# Patient Record
Sex: FEMALE | Race: BLACK OR AFRICAN AMERICAN | NOT HISPANIC OR LATINO | Employment: FULL TIME | URBAN - NONMETROPOLITAN AREA
[De-identification: names, ages, dates, MRNs, and addresses within clinical notes are randomized per-mention and may not be internally consistent; named-entity substitution may affect disease eponyms.]

---

## 2021-07-31 ENCOUNTER — OFFICE VISIT (OUTPATIENT)
Dept: FAMILY MEDICINE | Facility: CLINIC | Age: 26
End: 2021-07-31
Payer: COMMERCIAL

## 2021-07-31 VITALS
OXYGEN SATURATION: 99 % | SYSTOLIC BLOOD PRESSURE: 130 MMHG | WEIGHT: 250 LBS | TEMPERATURE: 100 F | RESPIRATION RATE: 20 BRPM | HEIGHT: 72 IN | BODY MASS INDEX: 33.86 KG/M2 | DIASTOLIC BLOOD PRESSURE: 89 MMHG | HEART RATE: 79 BPM

## 2021-07-31 DIAGNOSIS — R00.2 PALPITATIONS: ICD-10-CM

## 2021-07-31 DIAGNOSIS — R53.83 FATIGUE, UNSPECIFIED TYPE: Primary | ICD-10-CM

## 2021-07-31 LAB
T3FREE SERPL-MCNC: 3.16 PG/ML (ref 2.18–3.98)
T4 FREE SERPL-MCNC: 0.96 NG/DL (ref 0.76–1.46)
TSH SERPL DL<=0.005 MIU/L-ACNC: 2.04 UIU/ML (ref 0.36–3.74)

## 2021-07-31 PROCEDURE — 3075F SYST BP GE 130 - 139MM HG: CPT | Mod: ,,, | Performed by: NURSE PRACTITIONER

## 2021-07-31 PROCEDURE — 1160F PR REVIEW ALL MEDS BY PRESCRIBER/CLIN PHARMACIST DOCUMENTED: ICD-10-PCS | Mod: ,,, | Performed by: NURSE PRACTITIONER

## 2021-07-31 PROCEDURE — 84439 ASSAY OF FREE THYROXINE: CPT | Mod: ,,, | Performed by: CLINICAL MEDICAL LABORATORY

## 2021-07-31 PROCEDURE — 99202 OFFICE O/P NEW SF 15 MIN: CPT | Mod: ,,, | Performed by: NURSE PRACTITIONER

## 2021-07-31 PROCEDURE — 3079F DIAST BP 80-89 MM HG: CPT | Mod: ,,, | Performed by: NURSE PRACTITIONER

## 2021-07-31 PROCEDURE — 3079F PR MOST RECENT DIASTOLIC BLOOD PRESSURE 80-89 MM HG: ICD-10-PCS | Mod: ,,, | Performed by: NURSE PRACTITIONER

## 2021-07-31 PROCEDURE — 3008F PR BODY MASS INDEX (BMI) DOCUMENTED: ICD-10-PCS | Mod: ,,, | Performed by: NURSE PRACTITIONER

## 2021-07-31 PROCEDURE — 3075F PR MOST RECENT SYSTOLIC BLOOD PRESS GE 130-139MM HG: ICD-10-PCS | Mod: ,,, | Performed by: NURSE PRACTITIONER

## 2021-07-31 PROCEDURE — 84443 ASSAY THYROID STIM HORMONE: CPT | Mod: ,,, | Performed by: CLINICAL MEDICAL LABORATORY

## 2021-07-31 PROCEDURE — 84481 T3, FREE: ICD-10-PCS | Mod: ,,, | Performed by: CLINICAL MEDICAL LABORATORY

## 2021-07-31 PROCEDURE — 99051 PR MEDICAL SERVICES, EVE/WKEND/HOLIDAY: ICD-10-PCS | Mod: ,,, | Performed by: NURSE PRACTITIONER

## 2021-07-31 PROCEDURE — 3008F BODY MASS INDEX DOCD: CPT | Mod: ,,, | Performed by: NURSE PRACTITIONER

## 2021-07-31 PROCEDURE — 1159F MED LIST DOCD IN RCRD: CPT | Mod: ,,, | Performed by: NURSE PRACTITIONER

## 2021-07-31 PROCEDURE — 99202 PR OFFICE/OUTPT VISIT, NEW, LEVL II, 15-29 MIN: ICD-10-PCS | Mod: ,,, | Performed by: NURSE PRACTITIONER

## 2021-07-31 PROCEDURE — 84443 TSH: ICD-10-PCS | Mod: ,,, | Performed by: CLINICAL MEDICAL LABORATORY

## 2021-07-31 PROCEDURE — 1160F RVW MEDS BY RX/DR IN RCRD: CPT | Mod: ,,, | Performed by: NURSE PRACTITIONER

## 2021-07-31 PROCEDURE — 99051 MED SERV EVE/WKEND/HOLIDAY: CPT | Mod: ,,, | Performed by: NURSE PRACTITIONER

## 2021-07-31 PROCEDURE — 1159F PR MEDICATION LIST DOCUMENTED IN MEDICAL RECORD: ICD-10-PCS | Mod: ,,, | Performed by: NURSE PRACTITIONER

## 2021-07-31 PROCEDURE — 84481 FREE ASSAY (FT-3): CPT | Mod: ,,, | Performed by: CLINICAL MEDICAL LABORATORY

## 2021-07-31 PROCEDURE — 84439 T4, FREE: ICD-10-PCS | Mod: ,,, | Performed by: CLINICAL MEDICAL LABORATORY

## 2021-08-02 ENCOUNTER — TELEPHONE (OUTPATIENT)
Dept: FAMILY MEDICINE | Facility: CLINIC | Age: 26
End: 2021-08-02

## 2022-10-17 ENCOUNTER — OFFICE VISIT (OUTPATIENT)
Dept: OBSTETRICS AND GYNECOLOGY | Facility: CLINIC | Age: 27
End: 2022-10-17
Payer: COMMERCIAL

## 2022-10-17 VITALS
RESPIRATION RATE: 17 BRPM | WEIGHT: 272.19 LBS | SYSTOLIC BLOOD PRESSURE: 141 MMHG | DIASTOLIC BLOOD PRESSURE: 94 MMHG | OXYGEN SATURATION: 99 % | HEART RATE: 103 BPM | BODY MASS INDEX: 36.87 KG/M2 | HEIGHT: 72 IN

## 2022-10-17 DIAGNOSIS — Z72.51 HIGH RISK SEXUAL BEHAVIOR, UNSPECIFIED TYPE: ICD-10-CM

## 2022-10-17 DIAGNOSIS — N92.6 IRREGULAR MENSES: ICD-10-CM

## 2022-10-17 DIAGNOSIS — N89.8 VAGINAL DISCHARGE: Primary | ICD-10-CM

## 2022-10-17 LAB
CANDIDA SPECIES: NEGATIVE
GARDNERELLA: POSITIVE
TRICHOMONAS: NEGATIVE

## 2022-10-17 PROCEDURE — 3080F PR MOST RECENT DIASTOLIC BLOOD PRESSURE >= 90 MM HG: ICD-10-PCS | Mod: ,,, | Performed by: ADVANCED PRACTICE MIDWIFE

## 2022-10-17 PROCEDURE — 87480 BACTERIAL VAGINOSIS: ICD-10-PCS | Mod: ,,, | Performed by: CLINICAL MEDICAL LABORATORY

## 2022-10-17 PROCEDURE — 3080F DIAST BP >= 90 MM HG: CPT | Mod: ,,, | Performed by: ADVANCED PRACTICE MIDWIFE

## 2022-10-17 PROCEDURE — 87660 TRICHOMONAS VAGIN DIR PROBE: CPT | Mod: ,,, | Performed by: CLINICAL MEDICAL LABORATORY

## 2022-10-17 PROCEDURE — 87591 CHLAMYDIA/GONORRHOEAE(GC), PCR: ICD-10-PCS | Mod: ,,, | Performed by: CLINICAL MEDICAL LABORATORY

## 2022-10-17 PROCEDURE — 1159F MED LIST DOCD IN RCRD: CPT | Mod: ,,, | Performed by: ADVANCED PRACTICE MIDWIFE

## 2022-10-17 PROCEDURE — 87491 CHLAMYDIA/GONORRHOEAE(GC), PCR: ICD-10-PCS | Mod: ,,, | Performed by: CLINICAL MEDICAL LABORATORY

## 2022-10-17 PROCEDURE — 87660 BACTERIAL VAGINOSIS: ICD-10-PCS | Mod: ,,, | Performed by: CLINICAL MEDICAL LABORATORY

## 2022-10-17 PROCEDURE — 99212 OFFICE O/P EST SF 10 MIN: CPT | Mod: ,,, | Performed by: ADVANCED PRACTICE MIDWIFE

## 2022-10-17 PROCEDURE — 87480 CANDIDA DNA DIR PROBE: CPT | Mod: ,,, | Performed by: CLINICAL MEDICAL LABORATORY

## 2022-10-17 PROCEDURE — 3077F PR MOST RECENT SYSTOLIC BLOOD PRESSURE >= 140 MM HG: ICD-10-PCS | Mod: ,,, | Performed by: ADVANCED PRACTICE MIDWIFE

## 2022-10-17 PROCEDURE — 99212 PR OFFICE/OUTPT VISIT, EST, LEVL II, 10-19 MIN: ICD-10-PCS | Mod: ,,, | Performed by: ADVANCED PRACTICE MIDWIFE

## 2022-10-17 PROCEDURE — 1159F PR MEDICATION LIST DOCUMENTED IN MEDICAL RECORD: ICD-10-PCS | Mod: ,,, | Performed by: ADVANCED PRACTICE MIDWIFE

## 2022-10-17 PROCEDURE — 87510 BACTERIAL VAGINOSIS: ICD-10-PCS | Mod: ,,, | Performed by: CLINICAL MEDICAL LABORATORY

## 2022-10-17 PROCEDURE — 87591 N.GONORRHOEAE DNA AMP PROB: CPT | Mod: ,,, | Performed by: CLINICAL MEDICAL LABORATORY

## 2022-10-17 PROCEDURE — 3077F SYST BP >= 140 MM HG: CPT | Mod: ,,, | Performed by: ADVANCED PRACTICE MIDWIFE

## 2022-10-17 PROCEDURE — 87491 CHLMYD TRACH DNA AMP PROBE: CPT | Mod: ,,, | Performed by: CLINICAL MEDICAL LABORATORY

## 2022-10-17 PROCEDURE — 87510 GARDNER VAG DNA DIR PROBE: CPT | Mod: ,,, | Performed by: CLINICAL MEDICAL LABORATORY

## 2022-10-17 NOTE — PROGRESS NOTES
Patient ID:  Shruthi Christian is a 27 y.o. female      Chief Complaint:   Chief Complaint   Patient presents with    Vaginal Discharge     Watery with odor    Irregular cycle        HPI:  Shruthi is in with c/o watery odorous vag discharge and spotting between her regular menstrual cycle. Reports monthly cycles lasting 5-6 days with heavy bleeding first 3 days. LMP 22, has had spotting off and on since cycle. Denies abd pain. Last sexual encounter 2 months past. Reports Abnormal Pap ASCUS x 2 with colpo in 2022 while living in [place]. Unsure of HPV results. Was told to have repeat Pap in 1 year. Will have medical release signed to obtain Pap report for follow up. Discussed and agrees to STD testing to r/o infection as cause of spotting.   LMP: Patient's last menstrual period was 2022.   Sexually active:  yes  Contraceptive: none      Past Medical History:   Diagnosis Date    Anxiety     BECKY (generalized anxiety disorder)     GERD (gastroesophageal reflux disease)     Menorrhagia      Past Surgical History:   Procedure Laterality Date    WISDOM TOOTH EXTRACTION         OB History          0    Para   0    Term   0       0    AB   0    Living   0         SAB   0    IAB   0    Ectopic   0    Multiple   0    Live Births   0                 BP (!) 141/94 (BP Location: Right arm)   Pulse 103   Resp 17   Ht 6' (1.829 m)   Wt 123.5 kg (272 lb 3.2 oz)   LMP 2022   SpO2 99%   BMI 36.92 kg/m²   Wt Readings from Last 3 Encounters:   10/17/22 123.5 kg (272 lb 3.2 oz)   21 113.4 kg (250 lb)          ROS:  Review of Systems   Constitutional: Negative.    Eyes: Negative.    Respiratory: Negative.     Cardiovascular: Negative.    Gastrointestinal: Negative.    Genitourinary:  Positive for menstrual problem, vaginal bleeding and vaginal odor.   Musculoskeletal: Negative.    Neurological: Negative.    Psychiatric/Behavioral: Negative.          PHYSICAL EXAM:  Physical  Exam  Constitutional:       Appearance: Normal appearance. She is obese.   Eyes:      Extraocular Movements: Extraocular movements intact.   Cardiovascular:      Rate and Rhythm: Normal rate.      Pulses: Normal pulses.   Pulmonary:      Effort: Pulmonary effort is normal.   Abdominal:      Palpations: Abdomen is soft.   Musculoskeletal:         General: Normal range of motion.      Cervical back: Normal range of motion.   Skin:     General: Skin is warm and dry.   Neurological:      Mental Status: She is alert and oriented to person, place, and time.   Psychiatric:         Mood and Affect: Mood normal.         Behavior: Behavior normal.         Thought Content: Thought content normal.         Judgment: Judgment normal.        Assessment:  Shruthi was seen today for vaginal discharge and irregular cycle.    Diagnoses and all orders for this visit:    Vaginal discharge    High risk sexual behavior, unspecified type  -     Chlamydia/GC, PCR; Future  -     Bacterial Vaginosis; Future  -     Chlamydia/GC, PCR  -     Bacterial Vaginosis    Irregular menses    BMI 36.0-36.9,adult        ICD-10-CM ICD-9-CM    1. Vaginal discharge  N89.8 623.5       2. High risk sexual behavior, unspecified type  Z72.51 V69.2 Chlamydia/GC, PCR      Bacterial Vaginosis      Chlamydia/GC, PCR      Bacterial Vaginosis      3. Irregular menses  N92.6 626.4       4. BMI 36.0-36.9,adult  Z68.36 V85.36           Plan:  Affirm swab self collected  Urine sent for GC and chlamydia  Will call with results, encouraged use of My Chart for lab review  Encouraged use of condoms during sexual encounter to prevent STD exposure    Follow up if symptoms worsen or fail to improve.

## 2022-10-18 ENCOUNTER — TELEPHONE (OUTPATIENT)
Dept: OBSTETRICS AND GYNECOLOGY | Facility: CLINIC | Age: 27
End: 2022-10-18
Payer: COMMERCIAL

## 2022-10-18 DIAGNOSIS — B37.9 YEAST INFECTION: ICD-10-CM

## 2022-10-18 DIAGNOSIS — B96.89 BACTERIAL VAGINAL INFECTION: Primary | ICD-10-CM

## 2022-10-18 DIAGNOSIS — N76.0 BACTERIAL VAGINAL INFECTION: Primary | ICD-10-CM

## 2022-10-18 LAB
CHLAMYDIA BY PCR: NEGATIVE
N. GONORRHOEAE (GC) BY PCR: NEGATIVE

## 2022-10-18 RX ORDER — METRONIDAZOLE 500 MG/1
500 TABLET ORAL 2 TIMES DAILY
Qty: 14 TABLET | Refills: 0 | Status: SHIPPED | OUTPATIENT
Start: 2022-10-18 | End: 2022-10-25

## 2022-10-18 RX ORDER — FLUCONAZOLE 150 MG/1
150 TABLET ORAL
Qty: 3 TABLET | Refills: 0 | Status: SHIPPED | OUTPATIENT
Start: 2022-10-18 | End: 2022-10-25

## 2022-10-18 NOTE — TELEPHONE ENCOUNTER
----- Message from Jacquie Adams sent at 10/18/2022 10:45 AM CDT -----  Patient wants to know when meds will be called in at [facility] on [address].      Patient callback [phone]

## 2022-10-18 NOTE — TELEPHONE ENCOUNTER
----- Message from Shahla Mcclain sent at 10/18/2022  8:29 AM CDT -----  Pt called requesting a call back.      Call back # [phone]

## 2022-12-01 ENCOUNTER — OFFICE VISIT (OUTPATIENT)
Dept: OBSTETRICS AND GYNECOLOGY | Facility: CLINIC | Age: 27
End: 2022-12-01
Payer: COMMERCIAL

## 2022-12-01 VITALS
DIASTOLIC BLOOD PRESSURE: 76 MMHG | SYSTOLIC BLOOD PRESSURE: 124 MMHG | HEART RATE: 94 BPM | HEIGHT: 72 IN | OXYGEN SATURATION: 99 % | BODY MASS INDEX: 36.33 KG/M2 | WEIGHT: 268.19 LBS | RESPIRATION RATE: 17 BRPM

## 2022-12-01 DIAGNOSIS — Z32.01 POSITIVE URINE PREGNANCY TEST: ICD-10-CM

## 2022-12-01 DIAGNOSIS — N91.2 AMENORRHEA: Primary | ICD-10-CM

## 2022-12-01 PROBLEM — N92.6 IRREGULAR MENSES: Status: RESOLVED | Noted: 2022-10-17 | Resolved: 2022-12-01

## 2022-12-01 PROBLEM — N89.8 VAGINAL DISCHARGE: Status: RESOLVED | Noted: 2022-10-17 | Resolved: 2022-12-01

## 2022-12-01 LAB
B-HCG UR QL: POSITIVE
CTP QC/QA: YES

## 2022-12-01 PROCEDURE — 1159F PR MEDICATION LIST DOCUMENTED IN MEDICAL RECORD: ICD-10-PCS | Mod: ,,, | Performed by: ADVANCED PRACTICE MIDWIFE

## 2022-12-01 PROCEDURE — 99213 PR OFFICE/OUTPT VISIT, EST, LEVL III, 20-29 MIN: ICD-10-PCS | Mod: ,,, | Performed by: ADVANCED PRACTICE MIDWIFE

## 2022-12-01 PROCEDURE — 3078F DIAST BP <80 MM HG: CPT | Mod: ,,, | Performed by: ADVANCED PRACTICE MIDWIFE

## 2022-12-01 PROCEDURE — 3074F SYST BP LT 130 MM HG: CPT | Mod: ,,, | Performed by: ADVANCED PRACTICE MIDWIFE

## 2022-12-01 PROCEDURE — 81025 POCT URINE PREGNANCY: ICD-10-PCS | Mod: QW,,, | Performed by: ADVANCED PRACTICE MIDWIFE

## 2022-12-01 PROCEDURE — 3008F PR BODY MASS INDEX (BMI) DOCUMENTED: ICD-10-PCS | Mod: ,,, | Performed by: ADVANCED PRACTICE MIDWIFE

## 2022-12-01 PROCEDURE — 3078F PR MOST RECENT DIASTOLIC BLOOD PRESSURE < 80 MM HG: ICD-10-PCS | Mod: ,,, | Performed by: ADVANCED PRACTICE MIDWIFE

## 2022-12-01 PROCEDURE — 1159F MED LIST DOCD IN RCRD: CPT | Mod: ,,, | Performed by: ADVANCED PRACTICE MIDWIFE

## 2022-12-01 PROCEDURE — 81025 URINE PREGNANCY TEST: CPT | Mod: QW,,, | Performed by: ADVANCED PRACTICE MIDWIFE

## 2022-12-01 PROCEDURE — 3008F BODY MASS INDEX DOCD: CPT | Mod: ,,, | Performed by: ADVANCED PRACTICE MIDWIFE

## 2022-12-01 PROCEDURE — 3074F PR MOST RECENT SYSTOLIC BLOOD PRESSURE < 130 MM HG: ICD-10-PCS | Mod: ,,, | Performed by: ADVANCED PRACTICE MIDWIFE

## 2022-12-01 PROCEDURE — 99213 OFFICE O/P EST LOW 20 MIN: CPT | Mod: ,,, | Performed by: ADVANCED PRACTICE MIDWIFE

## 2022-12-01 RX ORDER — PNV NO.52/IRON/FA/OMEGA-3/DHA 29-1-200MG
1 COMBINATION PACKAGE (EA) ORAL DAILY
Qty: 60 EACH | Refills: 5 | Status: SHIPPED | OUTPATIENT
Start: 2022-12-01 | End: 2023-01-11

## 2022-12-01 NOTE — PROGRESS NOTES
Patient ID:  Shruthi Christian is a 27 y.o. female      Chief Complaint:   Chief Complaint   Patient presents with    Possible Pregnancy        HPI:  Shruthi is in for pregnancy confirmation. Reports monthly cycles, LMP 10/28/2022, sexually active, not on contraceptive. Has had positive home UPT. Plan of care discussed for prenatal care if pregnancy confirmed. Desires to use services of [facility].   LMP: Patient's last menstrual period was 10/28/2022.   Sexually active:  yes  Contraceptive:  in [place]. Has h/o abnormal Pap with colposcopy      Past Medical History:   Diagnosis Date    Anxiety     BECKY (generalized anxiety disorder)     GERD (gastroesophageal reflux disease)     Menorrhagia      Past Surgical History:   Procedure Laterality Date    WISDOM TOOTH EXTRACTION         OB History          1    Para   0    Term   0       0    AB   0    Living   0         SAB   0    IAB   0    Ectopic   0    Multiple   0    Live Births   0                 /76 (BP Location: Right arm)   Pulse 94   Resp 17   Ht 6' (1.829 m)   Wt 121.7 kg (268 lb 3.2 oz)   LMP 10/28/2022   SpO2 99%   BMI 36.37 kg/m²   Wt Readings from Last 3 Encounters:   22 121.7 kg (268 lb 3.2 oz)   10/17/22 123.5 kg (272 lb 3.2 oz)   21 113.4 kg (250 lb)          ROS:  Review of Systems   Constitutional: Negative.    Eyes: Negative.    Respiratory: Negative.     Cardiovascular: Negative.    Gastrointestinal: Negative.    Genitourinary:  Positive for menstrual problem.        Amenorrhea   Musculoskeletal: Negative.    Neurological: Negative.    Psychiatric/Behavioral: Negative.          PHYSICAL EXAM:  Physical Exam  Constitutional:       Appearance: Normal appearance. She is obese.   Eyes:      Extraocular Movements: Extraocular movements intact.   Cardiovascular:      Rate and Rhythm: Normal rate.      Pulses: Normal pulses.   Pulmonary:      Effort: Pulmonary effort is normal.   Abdominal:      Palpations:  Abdomen is soft.   Musculoskeletal:         General: Normal range of motion.      Cervical back: Normal range of motion.   Skin:     General: Skin is warm and dry.   Neurological:      Mental Status: She is alert and oriented to person, place, and time.   Psychiatric:         Mood and Affect: Mood normal.         Behavior: Behavior normal.         Thought Content: Thought content normal.         Judgment: Judgment normal.        Assessment:  Shruthi was seen today for possible pregnancy.    Diagnoses and all orders for this visit:    Amenorrhea  -     POCT urine pregnancy    Positive urine pregnancy test  -     PNV cmb 52-iron-FA-omega-3-dha (COMPLETE ANGELIQUE DHA) 70-3-822-200 mg Cmpk; Take 1 tablet by mouth once daily. for 365 doses    BMI 36.0-36.9,adult        ICD-10-CM ICD-9-CM    1. Amenorrhea  N91.2 626.0 POCT urine pregnancy      2. Positive urine pregnancy test  Z32.01 V72.42 PNV cmb 52-iron-FA-omega-3-dha (COMPLETE ANGELIQUE DHA) 00-1-916-200 mg Cmpk      3. BMI 36.0-36.9,adult  Z68.36 V85.36           Plan:  UPT positive  Discussed EDC as 2023 by LMP of 10/28/2022, approximately 4w6d  RX sent for prenatal vitamin daily  Plans to call & schedule new OB visit with [facility]  Encouraged healthier dietary habits, continue exercise regimen unless pain or bleeding occurs, avoid smoking and alcohol use  Pregnancy confirmation letter given    Follow up for prenatal care at [facility].

## 2022-12-06 DIAGNOSIS — Z36.89 ENCOUNTER TO ESTABLISH GESTATIONAL AGE USING ULTRASOUND: Primary | ICD-10-CM

## 2022-12-13 ENCOUNTER — TELEPHONE (OUTPATIENT)
Dept: OBSTETRICS AND GYNECOLOGY | Facility: CLINIC | Age: 27
End: 2022-12-13
Payer: COMMERCIAL

## 2022-12-13 NOTE — TELEPHONE ENCOUNTER
----- Message from Kathi Keys sent at 12/12/2022  3:33 PM CST -----  Please call Shruthi, need to now what can take ob patient. Shruthi can't keep in food down. Need to talk to nurse.          Phone [phone]        Thanks  Kathi

## 2022-12-15 ENCOUNTER — PATIENT MESSAGE (OUTPATIENT)
Dept: OBSTETRICS AND GYNECOLOGY | Facility: CLINIC | Age: 27
End: 2022-12-15
Payer: COMMERCIAL

## 2022-12-15 ENCOUNTER — TELEPHONE (OUTPATIENT)
Dept: OBSTETRICS AND GYNECOLOGY | Facility: CLINIC | Age: 27
End: 2022-12-15
Payer: COMMERCIAL

## 2022-12-15 NOTE — TELEPHONE ENCOUNTER
----- Message from Kathi Keys sent at 12/13/2022  1:45 PM CST -----  Please call Shruthi, missed your call this morning.          Phone [phone]          Thanks  Kathi

## 2022-12-19 ENCOUNTER — TELEPHONE (OUTPATIENT)
Dept: OBSTETRICS AND GYNECOLOGY | Facility: CLINIC | Age: 27
End: 2022-12-19
Payer: COMMERCIAL

## 2022-12-19 RX ORDER — ONDANSETRON 4 MG/1
4 TABLET, ORALLY DISINTEGRATING ORAL EVERY 6 HOURS PRN
Qty: 30 TABLET | Refills: 2 | Status: SHIPPED | OUTPATIENT
Start: 2022-12-19 | End: 2023-01-09

## 2023-01-09 ENCOUNTER — INITIAL PRENATAL (OUTPATIENT)
Dept: OBSTETRICS AND GYNECOLOGY | Facility: CLINIC | Age: 28
End: 2023-01-09
Payer: COMMERCIAL

## 2023-01-09 ENCOUNTER — HOSPITAL ENCOUNTER (OUTPATIENT)
Dept: RADIOLOGY | Facility: HOSPITAL | Age: 28
Discharge: HOME | End: 2023-01-09
Attending: STUDENT IN AN ORGANIZED HEALTH CARE EDUCATION/TRAINING PROGRAM
Payer: COMMERCIAL

## 2023-01-09 VITALS — SYSTOLIC BLOOD PRESSURE: 118 MMHG | BODY MASS INDEX: 32.82 KG/M2 | WEIGHT: 242 LBS | DIASTOLIC BLOOD PRESSURE: 70 MMHG

## 2023-01-09 DIAGNOSIS — O99.611 CONSTIPATION DURING PREGNANCY IN FIRST TRIMESTER: Primary | ICD-10-CM

## 2023-01-09 DIAGNOSIS — Z3A.10 10 WEEKS GESTATION OF PREGNANCY: ICD-10-CM

## 2023-01-09 DIAGNOSIS — Z36.89 ENCOUNTER TO ESTABLISH GESTATIONAL AGE USING ULTRASOUND: ICD-10-CM

## 2023-01-09 DIAGNOSIS — K59.00 CONSTIPATION DURING PREGNANCY IN FIRST TRIMESTER: Primary | ICD-10-CM

## 2023-01-09 LAB
BILIRUB SERPL-MCNC: NORMAL MG/DL
BLOOD URINE, POC: NORMAL
COLOR, POC UA: NORMAL
GLUCOSE UR QL STRIP: NORMAL
KETONES UR QL STRIP: NORMAL
LEUKOCYTE ESTERASE URINE, POC: NORMAL
NITRITE, POC UA: NORMAL
PH, POC UA: 6
PROTEIN, POC: NORMAL
SPECIFIC GRAVITY, POC UA: 1.02
UROBILINOGEN, POC UA: 2

## 2023-01-09 PROCEDURE — 87186 CULTURE, URINE: ICD-10-PCS | Mod: XU,,, | Performed by: CLINICAL MEDICAL LABORATORY

## 2023-01-09 PROCEDURE — G0481 OB DRUG SCREEN DEFINITIVE, URINE: ICD-10-PCS | Mod: ,,, | Performed by: CLINICAL MEDICAL LABORATORY

## 2023-01-09 PROCEDURE — 76801 OB US < 14 WKS SINGLE FETUS: CPT | Mod: TC

## 2023-01-09 PROCEDURE — 87491 CHLAMYDIA/GONORRHOEAE(GC), PCR: ICD-10-PCS | Mod: ,,, | Performed by: CLINICAL MEDICAL LABORATORY

## 2023-01-09 PROCEDURE — 87086 URINE CULTURE/COLONY COUNT: CPT | Mod: ,,, | Performed by: CLINICAL MEDICAL LABORATORY

## 2023-01-09 PROCEDURE — 87661 TRICHOMONAS VAGINALIS BY PCR: ICD-10-PCS | Mod: ,,, | Performed by: CLINICAL MEDICAL LABORATORY

## 2023-01-09 PROCEDURE — 87186 SC STD MICRODIL/AGAR DIL: CPT | Mod: XU,,, | Performed by: CLINICAL MEDICAL LABORATORY

## 2023-01-09 PROCEDURE — 99213 OFFICE O/P EST LOW 20 MIN: CPT | Mod: PBBFAC,25 | Performed by: STUDENT IN AN ORGANIZED HEALTH CARE EDUCATION/TRAINING PROGRAM

## 2023-01-09 PROCEDURE — 99203 OFFICE O/P NEW LOW 30 MIN: CPT | Mod: S$PBB,,, | Performed by: STUDENT IN AN ORGANIZED HEALTH CARE EDUCATION/TRAINING PROGRAM

## 2023-01-09 PROCEDURE — 99203 PR OFFICE/OUTPT VISIT, NEW, LEVL III, 30-44 MIN: ICD-10-PCS | Mod: S$PBB,,, | Performed by: STUDENT IN AN ORGANIZED HEALTH CARE EDUCATION/TRAINING PROGRAM

## 2023-01-09 PROCEDURE — G0481 DRUG TEST DEF 8-14 CLASSES: HCPCS | Mod: ,,, | Performed by: CLINICAL MEDICAL LABORATORY

## 2023-01-09 PROCEDURE — 76801 OB US < 14 WKS SINGLE FETUS: CPT | Mod: 26,,, | Performed by: RADIOLOGY

## 2023-01-09 PROCEDURE — 76801 US OB <14 WEEKS TRANSABDOM, SINGLE GESTATION: ICD-10-PCS | Mod: 26,,, | Performed by: RADIOLOGY

## 2023-01-09 PROCEDURE — 88142 CYTOPATH C/V THIN LAYER: CPT | Mod: TC,GCY | Performed by: STUDENT IN AN ORGANIZED HEALTH CARE EDUCATION/TRAINING PROGRAM

## 2023-01-09 PROCEDURE — 87086 CULTURE, URINE: ICD-10-PCS | Mod: ,,, | Performed by: CLINICAL MEDICAL LABORATORY

## 2023-01-09 PROCEDURE — 87077 CULTURE AEROBIC IDENTIFY: CPT | Mod: ,,, | Performed by: CLINICAL MEDICAL LABORATORY

## 2023-01-09 PROCEDURE — 81003 URINALYSIS AUTO W/O SCOPE: CPT | Mod: PBBFAC | Performed by: STUDENT IN AN ORGANIZED HEALTH CARE EDUCATION/TRAINING PROGRAM

## 2023-01-09 PROCEDURE — 87491 CHLMYD TRACH DNA AMP PROBE: CPT | Mod: ,,, | Performed by: CLINICAL MEDICAL LABORATORY

## 2023-01-09 PROCEDURE — 87591 CHLAMYDIA/GONORRHOEAE(GC), PCR: ICD-10-PCS | Mod: ,,, | Performed by: CLINICAL MEDICAL LABORATORY

## 2023-01-09 PROCEDURE — 87661 TRICHOMONAS VAGINALIS AMPLIF: CPT | Mod: ,,, | Performed by: CLINICAL MEDICAL LABORATORY

## 2023-01-09 PROCEDURE — 87077 CULTURE, URINE: ICD-10-PCS | Mod: ,,, | Performed by: CLINICAL MEDICAL LABORATORY

## 2023-01-09 PROCEDURE — 87591 N.GONORRHOEAE DNA AMP PROB: CPT | Mod: ,,, | Performed by: CLINICAL MEDICAL LABORATORY

## 2023-01-09 RX ORDER — ONDANSETRON 4 MG/1
4 TABLET, ORALLY DISINTEGRATING ORAL EVERY 6 HOURS PRN
Qty: 30 TABLET | Refills: 2 | Status: SHIPPED | OUTPATIENT
Start: 2023-01-09 | End: 2023-01-12 | Stop reason: SDUPTHER

## 2023-01-09 RX ORDER — ONDANSETRON 4 MG/1
4 TABLET, ORALLY DISINTEGRATING ORAL EVERY 6 HOURS PRN
Qty: 30 TABLET | Refills: 2 | Status: CANCELLED | OUTPATIENT
Start: 2023-01-09

## 2023-01-09 NOTE — PROGRESS NOTES
New OB History and Physical    CC:   Chief Complaint   Patient presents with    Initial Prenatal Visit     Complains of nausea and constipation   Also states she has lost 25 lbs in the last month           Assessment/Plan:   Shruthi Christian is a 27 y.o. at 10w3d who presents for new OB visit    Problem List Items Addressed This Visit    None  Visit Diagnoses       Constipation during pregnancy in first trimester    -  Primary    10 weeks gestation of pregnancy        Relevant Orders    CBC Auto Differential (Completed)    Basic Metabolic Panel (Completed)    ThinPrep Pap Test (Completed)    Hepatitis B Surface Antigen (Completed)    Rubella Antibody Screen (Completed)    Sickle Cell Screen (Completed)    Treponema Pallidum (Syphillis) Antibody (Completed)    Urine culture (Completed)    POCT URINALYSIS W/O SCOPE (Completed)    Type & Screen (Completed)    HIV 1/2 Ag/Ab (4th Gen) (Completed)    Chlamydia/GC, PCR (Completed)    OB Drug Screen Definitive, Urine (Completed)    Hepatitis C Antibody (Completed)    Trichomonas vaginalis by PCR (Completed)            HPI: Shruthi Christian is a 27 y.o. at 10w3d who presents for new OB visit.  C/o nausea and vomiting.  C/o constipation.    Review of Systems: The following ROS was otherwise negative, except as noted in the HPI:  constitutional, HEENT, respiratory, cardiovascular, gastrointestinal, genitourinary, skin, musculoskeletal, neurological, psych    Gynecologic History: Denies hx of abnl pap smears.  No hx of STIs.    Obstetrical History:  OB History          1    Para   0    Term   0       0    AB   0    Living   0         SAB   0    IAB   0    Ectopic   0    Multiple   0    Live Births   0                 Past Medical History:   Past Medical History:   Diagnosis Date    Anxiety     BECKY (generalized anxiety disorder)     GERD (gastroesophageal reflux disease)     Menorrhagia        Medications:  Medication List with Changes/Refills   New Medications     CEPHALEXIN (KEFLEX) 500 MG CAPSULE    Take 1 capsule (500 mg total) by mouth 4 (four) times daily. for 7 days    ONDANSETRON (ZOFRAN-ODT) 4 MG TBDL    Take 1 tablet (4 mg total) by mouth every 6 (six) hours as needed.   Discontinued Medications    ONDANSETRON (ZOFRAN-ODT) 4 MG TBDL    Take 1 tablet (4 mg total) by mouth every 6 (six) hours as needed.    PNV CMB 52-IRON-FA-OMEGA-3-DHA (COMPLETE ANGELIQUE DHA) 48-6-338-200 MG CMPK    Take 1 tablet by mouth once daily. for 365 doses         Allergies:  Patient has no known allergies.    Surgical History:  Past Surgical History:   Procedure Laterality Date    COLPOSCOPY  2022    WISDOM TOOTH EXTRACTION         Family History:  Family History   Problem Relation Age of Onset    Thyroid disease Mother     Thyroid disease Maternal Grandmother        Social History:  Social History     Substance and Sexual Activity   Alcohol Use Not Currently    Comment: social      Social History     Substance and Sexual Activity   Drug Use Never     Social History     Tobacco Use   Smoking Status Never   Smokeless Tobacco Never       Physical Exam:  /70   Wt 109.8 kg (242 lb)   LMP 10/28/2022   BMI 32.82 kg/m²     General: Alert, well appearing, no acute distress  Head: Normocephalic, atraumatic  Lungs: unlabored respirations  Abdomen: Gravid, soft, nontender   Pelvic:   External: normal female genitalia, no masses or lesions   Vagina: moist, pink mucosa with rugae, physiologic discharge  Cervix: no masses or lesions, nontender, pap collected  Uterus: approx 10 weeks, mobile, midline, nontender  Adnexa: no masses or fullness, nontender  Rectovaginal: deferred  Extremities: No redness or tenderness  Skin: Well perfused, normal coloration and turgor, no lesions or rashes visualized  Neuro: Alert, oriented, normal speech, no focal deficits, moves extremities appropriately  Psych: Appropriate, normal affect, appears stated age  Osteopathic: No TART changes      Reviewed frequency of  appointments for routine prenatal care.  NIPT at next appt.  Rx for Zofran 4 mg ODT sent to pharmacy.  Recommended Miralax, colace and Milk of Magnesia for constipation.  Encouraged Mychart access.  Instructed to stop by the lab after visit for NOB labwork.

## 2023-01-10 LAB
CHLAMYDIA BY PCR: NEGATIVE
N. GONORRHOEAE (GC) BY PCR: NEGATIVE
TRICHOMONAS NAT: NEGATIVE

## 2023-01-11 LAB
7-AMINOCLONAZEPAM, URINE (RUSH): NEGATIVE 25 NG/ML
A-HYDROXYALPRAZOLAM, URINE (RUSH): NEGATIVE 25 NG/ML
AMITRIPTYLINE, URINE (RUSH): NEGATIVE 25 NG/ML
BENZOYLECGONINE, URINE (RUSH): NEGATIVE 100 NG/ML
BUTALBITAL, URINE (RUSH): NEGATIVE 50 NG/ML
CARISOPRODOL, URINE (RUSH): NEGATIVE 100 NG/ML
CODEINE, URINE (RUSH): NEGATIVE 25 NG/ML
CREAT UR-MCNC: 260 MG/DL (ref 28–219)
EDDP, URINE (RUSH): NEGATIVE 25 NG/ML
GH SERPL-MCNC: NORMAL NG/ML
HYDROCODONE, URINE (RUSH): NEGATIVE 25 NG/ML
HYDROMORPHONE, URINE (RUSH): NEGATIVE 25 NG/ML
INSULIN SERPL-ACNC: NORMAL U[IU]/ML
LAB AP CLINICAL INFORMATION: NORMAL
LAB AP GYN INTERPRETATION: NEGATIVE
LAB AP PAP DISCLAIMER COMMENTS: NORMAL
LORAZEPAM, URINE (RUSH): NEGATIVE 25 NG/ML
MEPROBAMATE, URINE (RUSH): NEGATIVE 100 NG/ML
METHADONE UR QL SCN: NEGATIVE 25 NG/ML
METHAMPHET UR QL SCN: NEGATIVE
MORPHINE, URINE (RUSH): NEGATIVE 25 NG/ML
NORDIAZEPAM, URINE (RUSH): NEGATIVE 25 NG/ML
NORHYDROCODONE, URINE (RUSH): NEGATIVE 50 NG/ML
NOROXYCODONE HCL, URINE (RUSH): NEGATIVE 50 NG/ML
OXAZEPAM, URINE (RUSH): NEGATIVE 25 NG/ML
OXYCODONE UR QL SCN: NEGATIVE 25 NG/ML
OXYMORPHONE, URINE (RUSH): NEGATIVE 25 NG/ML
PH UR STRIP: 6.5 PH UNITS
PHENOBARBITAL, URINE (RUSH): NEGATIVE 50 NG/ML
RENIN PLAS-CCNC: NORMAL NG/ML/H
SECOBARBITAL, URINE (RUSH): NEGATIVE 50 NG/ML
SP GR UR STRIP: 1.02
TEMAZEPAM, URINE (RUSH): NEGATIVE 25 NG/ML
THC-COOH, URINE (RUSH): NEGATIVE 25 NG/ML

## 2023-01-11 RX ORDER — NITROFURANTOIN 25; 75 MG/1; MG/1
100 CAPSULE ORAL 2 TIMES DAILY
Qty: 14 CAPSULE | Refills: 0 | Status: SHIPPED | OUTPATIENT
Start: 2023-01-11 | End: 2023-01-12

## 2023-01-12 ENCOUNTER — PATIENT MESSAGE (OUTPATIENT)
Dept: OBSTETRICS AND GYNECOLOGY | Facility: CLINIC | Age: 28
End: 2023-01-12
Payer: COMMERCIAL

## 2023-01-12 LAB
UA COMPLETE W REFLEX CULTURE PNL UR: ABNORMAL
UA COMPLETE W REFLEX CULTURE PNL UR: ABNORMAL

## 2023-01-12 RX ORDER — ONDANSETRON 4 MG/1
4 TABLET, ORALLY DISINTEGRATING ORAL EVERY 6 HOURS PRN
Qty: 30 TABLET | Refills: 2 | Status: SHIPPED | OUTPATIENT
Start: 2023-01-12 | End: 2023-10-23

## 2023-01-12 RX ORDER — CEPHALEXIN 500 MG/1
500 CAPSULE ORAL 4 TIMES DAILY
Qty: 28 CAPSULE | Refills: 0 | Status: SHIPPED | OUTPATIENT
Start: 2023-01-12 | End: 2023-01-19

## 2023-01-12 NOTE — TELEPHONE ENCOUNTER
Rtn pt's call, she had questions regarding the recent Urine Culture lab she seen. I let her know that since she's already on an Abx that she'll just finish that course out. Also whenever she sees labs out of range, not be to be alarmed. One of us will call her & let her know how  is handling it. Pt also wanted to know about food restriction doing pregnancy? We went over some food recommendation. Pt also needed a refill for her Zofran. Let her know I would send that request to . Pt verbalized understanding of all info given

## 2023-01-12 NOTE — TELEPHONE ENCOUNTER
----- Message from Chela Moreira sent at 1/11/2023 11:45 AM CST -----  Regarding: test results  Patient called and asked if someone would call her back pertaining test results. [phone]

## 2023-01-19 ENCOUNTER — PATIENT MESSAGE (OUTPATIENT)
Dept: OBSTETRICS AND GYNECOLOGY | Facility: CLINIC | Age: 28
End: 2023-01-19
Payer: COMMERCIAL

## 2023-01-21 ENCOUNTER — HOSPITAL ENCOUNTER (OUTPATIENT)
Facility: HOSPITAL | Age: 28
Discharge: HOME | End: 2023-01-24
Attending: SPECIALIST | Admitting: SPECIALIST
Payer: COMMERCIAL

## 2023-01-21 DIAGNOSIS — R74.8 ELEVATED LIVER ENZYMES: Primary | ICD-10-CM

## 2023-01-21 DIAGNOSIS — Z34.91 FIRST TRIMESTER PREGNANCY: ICD-10-CM

## 2023-01-21 DIAGNOSIS — R10.9 ABDOMINAL PAIN: ICD-10-CM

## 2023-01-21 LAB
ALBUMIN SERPL BCP-MCNC: 3.6 G/DL (ref 3.5–5)
ALBUMIN/GLOB SERPL: 0.9 {RATIO}
ALP SERPL-CCNC: 136 U/L (ref 37–98)
ALT SERPL W P-5'-P-CCNC: 153 U/L (ref 13–56)
AMYLASE SERPL-CCNC: 102 U/L (ref 25–115)
ANION GAP SERPL CALCULATED.3IONS-SCNC: 18 MMOL/L (ref 7–16)
AST SERPL W P-5'-P-CCNC: 78 U/L (ref 15–37)
BACTERIA #/AREA URNS HPF: NORMAL /HPF
BASOPHILS # BLD AUTO: 0.03 K/UL (ref 0–0.2)
BASOPHILS NFR BLD AUTO: 0.5 % (ref 0–1)
BILIRUB SERPL-MCNC: 0.9 MG/DL (ref ?–1.2)
BILIRUB UR QL STRIP: 0.5
BUN SERPL-MCNC: 6 MG/DL (ref 7–18)
BUN/CREAT SERPL: 10 (ref 6–20)
CALCIUM SERPL-MCNC: 9.7 MG/DL (ref 8.5–10.1)
CHLORIDE SERPL-SCNC: 100 MMOL/L (ref 98–107)
CLARITY UR: CLEAR
CO2 SERPL-SCNC: 24 MMOL/L (ref 21–32)
COLOR UR: YELLOW
CREAT SERPL-MCNC: 0.58 MG/DL (ref 0.55–1.02)
DIFFERENTIAL METHOD BLD: ABNORMAL
EGFR (NO RACE VARIABLE) (RUSH/TITUS): 128 ML/MIN/1.73M²
EOSINOPHIL # BLD AUTO: 0.03 K/UL (ref 0–0.5)
EOSINOPHIL NFR BLD AUTO: 0.5 % (ref 1–4)
ERYTHROCYTE [DISTWIDTH] IN BLOOD BY AUTOMATED COUNT: 13.1 % (ref 11.5–14.5)
GLOBULIN SER-MCNC: 3.8 G/DL (ref 2–4)
GLUCOSE SERPL-MCNC: 86 MG/DL (ref 74–106)
GLUCOSE UR STRIP-MCNC: NORMAL MG/DL
HCT VFR BLD AUTO: 40.1 % (ref 38–47)
HGB BLD-MCNC: 13.1 G/DL (ref 12–16)
IMM GRANULOCYTES # BLD AUTO: 0.02 K/UL (ref 0–0.04)
IMM GRANULOCYTES NFR BLD: 0.4 % (ref 0–0.4)
KETONES UR STRIP-SCNC: ABNORMAL MG/DL
LEUKOCYTE ESTERASE UR QL STRIP: NEGATIVE
LIPASE SERPL-CCNC: 464 U/L (ref 73–393)
LYMPHOCYTES # BLD AUTO: 1.05 K/UL (ref 1–4.8)
LYMPHOCYTES NFR BLD AUTO: 19 % (ref 27–41)
MCH RBC QN AUTO: 26.4 PG (ref 27–31)
MCHC RBC AUTO-ENTMCNC: 32.7 G/DL (ref 32–36)
MCV RBC AUTO: 80.8 FL (ref 80–96)
MONOCYTES # BLD AUTO: 0.58 K/UL (ref 0–0.8)
MONOCYTES NFR BLD AUTO: 10.5 % (ref 2–6)
MPC BLD CALC-MCNC: 10.3 FL (ref 9.4–12.4)
NEUTROPHILS # BLD AUTO: 3.82 K/UL (ref 1.8–7.7)
NEUTROPHILS NFR BLD AUTO: 69.1 % (ref 53–65)
NITRITE UR QL STRIP: NEGATIVE
NRBC # BLD AUTO: 0 X10E3/UL
NRBC, AUTO (.00): 0 %
PH UR STRIP: 6.5 PH UNITS
PLATELET # BLD AUTO: 283 K/UL (ref 150–400)
POTASSIUM SERPL-SCNC: 3.7 MMOL/L (ref 3.5–5.1)
PROT SERPL-MCNC: 7.4 G/DL (ref 6.4–8.2)
PROT UR QL STRIP: 100
RBC # BLD AUTO: 4.96 M/UL (ref 4.2–5.4)
RBC # UR STRIP: NEGATIVE /UL
RBC #/AREA URNS HPF: NORMAL /HPF
SODIUM SERPL-SCNC: 138 MMOL/L (ref 136–145)
SP GR UR STRIP: 1.03
SQUAMOUS #/AREA URNS LPF: NORMAL /LPF
UROBILINOGEN UR STRIP-ACNC: 6 MG/DL
WBC # BLD AUTO: 5.53 K/UL (ref 4.5–11)
WBC #/AREA URNS HPF: NORMAL /HPF
YEAST #/AREA URNS HPF: NORMAL /HPF

## 2023-01-21 PROCEDURE — 96361 HYDRATE IV INFUSION ADD-ON: CPT

## 2023-01-21 PROCEDURE — 80053 COMPREHEN METABOLIC PANEL: CPT | Performed by: EMERGENCY MEDICINE

## 2023-01-21 PROCEDURE — 99285 EMERGENCY DEPT VISIT HI MDM: CPT | Mod: CS,,, | Performed by: NURSE PRACTITIONER

## 2023-01-21 PROCEDURE — 36415 COLL VENOUS BLD VENIPUNCTURE: CPT | Performed by: EMERGENCY MEDICINE

## 2023-01-21 PROCEDURE — 96375 TX/PRO/DX INJ NEW DRUG ADDON: CPT

## 2023-01-21 PROCEDURE — 99285 PR EMERGENCY DEPT VISIT,LEVEL V: ICD-10-PCS | Mod: CS,,, | Performed by: NURSE PRACTITIONER

## 2023-01-21 PROCEDURE — 85025 COMPLETE CBC W/AUTO DIFF WBC: CPT | Performed by: EMERGENCY MEDICINE

## 2023-01-21 PROCEDURE — 99285 EMERGENCY DEPT VISIT HI MDM: CPT | Mod: 25

## 2023-01-21 PROCEDURE — 82150 ASSAY OF AMYLASE: CPT | Performed by: NURSE PRACTITIONER

## 2023-01-21 PROCEDURE — 25000003 PHARM REV CODE 250: Performed by: EMERGENCY MEDICINE

## 2023-01-21 PROCEDURE — 83690 ASSAY OF LIPASE: CPT | Performed by: NURSE PRACTITIONER

## 2023-01-21 PROCEDURE — 63600175 PHARM REV CODE 636 W HCPCS: Performed by: NURSE PRACTITIONER

## 2023-01-21 PROCEDURE — 84702 CHORIONIC GONADOTROPIN TEST: CPT | Performed by: NURSE PRACTITIONER

## 2023-01-21 PROCEDURE — 81001 URINALYSIS AUTO W/SCOPE: CPT | Performed by: EMERGENCY MEDICINE

## 2023-01-21 RX ORDER — ONDANSETRON 2 MG/ML
4 INJECTION INTRAMUSCULAR; INTRAVENOUS ONCE
Status: DISCONTINUED | OUTPATIENT
Start: 2023-01-22 | End: 2023-01-21

## 2023-01-21 RX ORDER — ONDANSETRON 2 MG/ML
4 INJECTION INTRAMUSCULAR; INTRAVENOUS
Status: COMPLETED | OUTPATIENT
Start: 2023-01-21 | End: 2023-01-21

## 2023-01-21 RX ADMIN — ONDANSETRON HYDROCHLORIDE 4 MG: 2 SOLUTION INTRAMUSCULAR; INTRAVENOUS at 10:01

## 2023-01-21 RX ADMIN — SODIUM CHLORIDE 1000 ML: 900 INJECTION, SOLUTION INTRAVENOUS at 10:01

## 2023-01-22 LAB
25(OH)D3 SERPL-MCNC: 32 NG/ML
ALBUMIN SERPL BCP-MCNC: 3.1 G/DL (ref 3.5–5)
ALBUMIN/GLOB SERPL: 0.9 {RATIO}
ALP SERPL-CCNC: 114 U/L (ref 37–98)
ALT SERPL W P-5'-P-CCNC: 133 U/L (ref 13–56)
ANION GAP SERPL CALCULATED.3IONS-SCNC: 16 MMOL/L (ref 7–16)
AST SERPL W P-5'-P-CCNC: 68 U/L (ref 15–37)
BILIRUB SERPL-MCNC: 0.8 MG/DL (ref ?–1.2)
BUN SERPL-MCNC: 5 MG/DL (ref 7–18)
BUN/CREAT SERPL: 10 (ref 6–20)
CALCIUM SERPL-MCNC: 8.9 MG/DL (ref 8.5–10.1)
CERULOPLASMIN SERPL-MCNC: 48.8 MG/DL
CHLORIDE SERPL-SCNC: 103 MMOL/L (ref 98–107)
CO2 SERPL-SCNC: 24 MMOL/L (ref 21–32)
CREAT SERPL-MCNC: 0.48 MG/DL (ref 0.55–1.02)
EGFR (NO RACE VARIABLE) (RUSH/TITUS): 134 ML/MIN/1.73M²
FERRITIN SERPL-MCNC: 70 NG/ML (ref 8–252)
GGT SERPL-CCNC: 180 U/L (ref 5–55)
GLOBULIN SER-MCNC: 3.4 G/DL (ref 2–4)
GLUCOSE SERPL-MCNC: 80 MG/DL (ref 74–106)
HAV IGM SER QL: NORMAL
HBV SURFACE AG SERPL QL IA: NORMAL
HCG SERPL-ACNC: NORMAL MIU/ML
IGG SERPL-MCNC: 996 MG/DL (ref 767–1590)
IRON SATN MFR SERPL: 19 % (ref 14–50)
IRON SERPL-MCNC: 43 ΜG/DL (ref 50–170)
LIPASE SERPL-CCNC: 487 U/L (ref 73–393)
POTASSIUM SERPL-SCNC: 3.5 MMOL/L (ref 3.5–5.1)
PROT SERPL-MCNC: 6.5 G/DL (ref 6.4–8.2)
SARS-COV-2 RDRP RESP QL NAA+PROBE: NEGATIVE
SODIUM SERPL-SCNC: 139 MMOL/L (ref 136–145)
TIBC SERPL-MCNC: 229 ΜG/DL (ref 250–450)

## 2023-01-22 PROCEDURE — 36415 COLL VENOUS BLD VENIPUNCTURE: CPT | Performed by: NURSE PRACTITIONER

## 2023-01-22 PROCEDURE — 82784 ASSAY IGA/IGD/IGG/IGM EACH: CPT | Performed by: STUDENT IN AN ORGANIZED HEALTH CARE EDUCATION/TRAINING PROGRAM

## 2023-01-22 PROCEDURE — 99223 1ST HOSP IP/OBS HIGH 75: CPT | Mod: ,,, | Performed by: STUDENT IN AN ORGANIZED HEALTH CARE EDUCATION/TRAINING PROGRAM

## 2023-01-22 PROCEDURE — 82306 VITAMIN D 25 HYDROXY: CPT | Performed by: STUDENT IN AN ORGANIZED HEALTH CARE EDUCATION/TRAINING PROGRAM

## 2023-01-22 PROCEDURE — 99223 PR INITIAL HOSPITAL CARE,LEVL III: ICD-10-PCS | Mod: ,,, | Performed by: STUDENT IN AN ORGANIZED HEALTH CARE EDUCATION/TRAINING PROGRAM

## 2023-01-22 PROCEDURE — 83540 ASSAY OF IRON: CPT | Performed by: STUDENT IN AN ORGANIZED HEALTH CARE EDUCATION/TRAINING PROGRAM

## 2023-01-22 PROCEDURE — G0378 HOSPITAL OBSERVATION PER HR: HCPCS

## 2023-01-22 PROCEDURE — 25000003 PHARM REV CODE 250: Performed by: NURSE PRACTITIONER

## 2023-01-22 PROCEDURE — 86704 HEP B CORE ANTIBODY TOTAL: CPT | Mod: 90 | Performed by: STUDENT IN AN ORGANIZED HEALTH CARE EDUCATION/TRAINING PROGRAM

## 2023-01-22 PROCEDURE — 63600175 PHARM REV CODE 636 W HCPCS: Performed by: NURSE PRACTITIONER

## 2023-01-22 PROCEDURE — 82390 ASSAY OF CERULOPLASMIN: CPT | Performed by: STUDENT IN AN ORGANIZED HEALTH CARE EDUCATION/TRAINING PROGRAM

## 2023-01-22 PROCEDURE — 83550 IRON BINDING TEST: CPT | Performed by: STUDENT IN AN ORGANIZED HEALTH CARE EDUCATION/TRAINING PROGRAM

## 2023-01-22 PROCEDURE — 83690 ASSAY OF LIPASE: CPT | Performed by: NURSE PRACTITIONER

## 2023-01-22 PROCEDURE — 87635 SARS-COV-2 COVID-19 AMP PRB: CPT | Performed by: NURSE PRACTITIONER

## 2023-01-22 PROCEDURE — 63600175 PHARM REV CODE 636 W HCPCS: Performed by: SPECIALIST

## 2023-01-22 PROCEDURE — 82977 ASSAY OF GGT: CPT | Performed by: STUDENT IN AN ORGANIZED HEALTH CARE EDUCATION/TRAINING PROGRAM

## 2023-01-22 PROCEDURE — 86015 ACTIN ANTIBODY EACH: CPT | Mod: 90 | Performed by: STUDENT IN AN ORGANIZED HEALTH CARE EDUCATION/TRAINING PROGRAM

## 2023-01-22 PROCEDURE — 82728 ASSAY OF FERRITIN: CPT | Performed by: STUDENT IN AN ORGANIZED HEALTH CARE EDUCATION/TRAINING PROGRAM

## 2023-01-22 PROCEDURE — 86038 ANTINUCLEAR ANTIBODIES: CPT | Performed by: STUDENT IN AN ORGANIZED HEALTH CARE EDUCATION/TRAINING PROGRAM

## 2023-01-22 PROCEDURE — 96376 TX/PRO/DX INJ SAME DRUG ADON: CPT

## 2023-01-22 PROCEDURE — 96361 HYDRATE IV INFUSION ADD-ON: CPT

## 2023-01-22 PROCEDURE — 80053 COMPREHEN METABOLIC PANEL: CPT | Performed by: NURSE PRACTITIONER

## 2023-01-22 PROCEDURE — 96365 THER/PROPH/DIAG IV INF INIT: CPT

## 2023-01-22 PROCEDURE — 86709 HEPATITIS A IGM ANTIBODY: CPT | Performed by: STUDENT IN AN ORGANIZED HEALTH CARE EDUCATION/TRAINING PROGRAM

## 2023-01-22 PROCEDURE — 87340 HEPATITIS B SURFACE AG IA: CPT | Performed by: STUDENT IN AN ORGANIZED HEALTH CARE EDUCATION/TRAINING PROGRAM

## 2023-01-22 RX ORDER — ONDANSETRON 2 MG/ML
4 INJECTION INTRAMUSCULAR; INTRAVENOUS EVERY 6 HOURS PRN
Status: DISCONTINUED | OUTPATIENT
Start: 2023-01-22 | End: 2023-01-23

## 2023-01-22 RX ORDER — SODIUM CHLORIDE 9 MG/ML
INJECTION, SOLUTION INTRAVENOUS CONTINUOUS
Status: DISCONTINUED | OUTPATIENT
Start: 2023-01-22 | End: 2023-01-23

## 2023-01-22 RX ORDER — DEXTROSE, SODIUM CHLORIDE, SODIUM LACTATE, POTASSIUM CHLORIDE, AND CALCIUM CHLORIDE 5; .6; .31; .03; .02 G/100ML; G/100ML; G/100ML; G/100ML; G/100ML
INJECTION, SOLUTION INTRAVENOUS CONTINUOUS
Status: DISCONTINUED | OUTPATIENT
Start: 2023-01-22 | End: 2023-01-24 | Stop reason: HOSPADM

## 2023-01-22 RX ADMIN — ONDANSETRON HYDROCHLORIDE 4 MG: 2 SOLUTION INTRAMUSCULAR; INTRAVENOUS at 07:01

## 2023-01-22 RX ADMIN — PROMETHAZINE HYDROCHLORIDE 25 MG: 25 INJECTION INTRAMUSCULAR; INTRAVENOUS at 12:01

## 2023-01-22 RX ADMIN — SODIUM CHLORIDE: 9 INJECTION, SOLUTION INTRAVENOUS at 12:01

## 2023-01-22 RX ADMIN — SODIUM CHLORIDE, SODIUM LACTATE, POTASSIUM CHLORIDE, CALCIUM CHLORIDE AND DEXTROSE MONOHYDRATE: 5; 600; 310; 30; 20 INJECTION, SOLUTION INTRAVENOUS at 10:01

## 2023-01-22 RX ADMIN — SODIUM CHLORIDE, SODIUM LACTATE, POTASSIUM CHLORIDE, CALCIUM CHLORIDE AND DEXTROSE MONOHYDRATE: 5; 600; 310; 30; 20 INJECTION, SOLUTION INTRAVENOUS at 06:01

## 2023-01-22 NOTE — ED PROVIDER NOTES
Encounter Date: 1/21/2023       History     Chief Complaint   Patient presents with    Vomiting     12 weeks pregnant - pt states has been taking zofran 4mg TID     KR is a 28 y/o BF who presents today with c/o vomiting x3 days. She is 12 weeks pregnant. She denies any vaginal bleeding    The history is provided by the patient. No  was used.   Emesis   This is a new problem. The current episode started several days ago. The problem occurs 5 - 10 times per day. The problem has been gradually worsening. The emesis has an appearance of stomach contents and bilious material. Pertinent negatives include no abdominal pain, no arthralgias, no chills, no cough, no diarrhea, no fever, no headaches, no myalgias, no sweats and no URI.     Review of patient's allergies indicates:  No Known Allergies  Past Medical History:   Diagnosis Date    Anxiety     BECKY (generalized anxiety disorder)     GERD (gastroesophageal reflux disease)     Menorrhagia      Past Surgical History:   Procedure Laterality Date    COLPOSCOPY  April 2022    WISDOM TOOTH EXTRACTION       Family History   Problem Relation Age of Onset    Thyroid disease Mother     Thyroid disease Maternal Grandmother      Social History     Tobacco Use    Smoking status: Never    Smokeless tobacco: Never   Substance Use Topics    Alcohol use: Not Currently     Comment: social     Drug use: Never     Review of Systems   Constitutional:  Negative for chills and fever.   HENT:  Negative for sore throat.    Respiratory:  Negative for cough and shortness of breath.    Cardiovascular:  Negative for chest pain.   Gastrointestinal:  Positive for vomiting. Negative for abdominal pain, diarrhea and nausea.   Genitourinary:  Positive for decreased urine volume and dysuria. Negative for difficulty urinating, urgency and vaginal bleeding.   Musculoskeletal:  Negative for arthralgias, back pain and myalgias.   Skin:  Negative for rash.   Neurological:  Negative for  weakness and headaches.   Hematological:  Does not bruise/bleed easily.     Physical Exam     Initial Vitals [01/21/23 2052]   BP Pulse Resp Temp SpO2   (!) 131/92 (!) 118 18 97.9 °F (36.6 °C) 98 %      MAP       --         Physical Exam    Nursing note and vitals reviewed.  Constitutional: She appears well-developed and well-nourished. She appears ill.   HENT:   Head: Normocephalic.   Eyes: Conjunctivae are normal.   Neck:   Normal range of motion.  Cardiovascular:  Normal rate and regular rhythm.           Abdominal: Abdomen is soft. Bowel sounds are normal. There is generalized abdominal tenderness.   Musculoskeletal:         General: Normal range of motion.      Cervical back: Normal range of motion.     Neurological: She is alert and oriented to person, place, and time. GCS score is 15. GCS eye subscore is 4. GCS verbal subscore is 5. GCS motor subscore is 6.   Skin: Skin is warm. Capillary refill takes less than 2 seconds.   Psychiatric: She has a normal mood and affect. Her behavior is normal. Judgment and thought content normal.       Medical Screening Exam   See Full Note    ED Course   Procedures  Labs Reviewed   COMPREHENSIVE METABOLIC PANEL - Abnormal; Notable for the following components:       Result Value    Anion Gap 18 (*)     BUN 6 (*)     Alk Phos 136 (*)      (*)     AST 78 (*)     All other components within normal limits   URINALYSIS, REFLEX TO URINE CULTURE - Abnormal; Notable for the following components:    Protein,  (*)     Ketones, UA OVER (*)     Urobilinogen, UA 6 (*)     Bilirubin, UA 0.5 (*)     Specific Gravity, UA 1.031 (*)     All other components within normal limits   CBC WITH DIFFERENTIAL - Abnormal; Notable for the following components:    MCH 26.4 (*)     Neutrophils % 69.1 (*)     Lymphocytes % 19.0 (*)     Monocytes % 10.5 (*)     Eosinophils % 0.5 (*)     All other components within normal limits   LIPASE - Abnormal; Notable for the following components:     Lipase 464 (*)     All other components within normal limits   URINALYSIS, MICROSCOPIC - Normal   AMYLASE - Normal   CBC W/ AUTO DIFFERENTIAL    Narrative:     The following orders were created for panel order CBC auto differential.  Procedure                               Abnormality         Status                     ---------                               -----------         ------                     CBC with Differential[[id number]]        Abnormal            Final result                 Please view results for these tests on the individual orders.   HCG, TOTAL, QUANTITATIVE   SARS-COV-2 RNA AMPLIFICATION, QUAL          Imaging Results              US OB Limited 1 Or More Gestations (In process)                      US Abdomen Limited_Gallbladder (In process)                      Medications   0.9%  NaCl infusion (has no administration in time range)   ondansetron injection 4 mg (has no administration in time range)   promethazine (PHENERGAN) 25 mg in dextrose 5 % 50 mL IVPB (has no administration in time range)   sodium chloride 0.9% bolus 1,000 mL 1,000 mL (0 mLs Intravenous Stopped 1/21/23 5999)   ondansetron injection 4 mg (4 mg Intravenous Given 1/21/23 5391)     Medical Decision Making:   Initial Assessment:   Patient dry heaving with c/o persistent vomiting x3 days  Differential Diagnosis:   hyperemesis gravidarum    Clinical Tests:   Lab Tests: Ordered and Reviewed  The following lab test(s) were unremarkable: CBC, CMP, Urinalysis, B-HCG and Lipase       <> Summary of Lab: Urobilinogen, proteinuria, ketonuria,   Elevated ALP, ALT, AST.   Neutrophils 69  Lipase 464    Radiological Study: Ordered  ED Management:  Elevated liver enzymes. Gallbladder ultrasound ordered.   OB ultrasound to assess FHTs.     Other:   I have discussed this case with another health care provider.       <> Summary of the Discussion: Discussed HPI and lab results with Dr. Ríos who recommended abdominal ultrasound.   0026:  Discussed HPI, PMH, and diagnostics with Dr. Romano. Agrees to admit patient to observation for repeat AM labs and IV fluids.                  Clinical Impression:   Final diagnoses:  [R74.8] Elevated liver enzymes (Primary)  [R10.9] Abdominal pain  [Z34.91] First trimester pregnancy        ED Disposition Condition    Observation Stable                MERE Monsivais  01/22/23 0048

## 2023-01-22 NOTE — H&P
[facility]  Obstetrics  History & Physical    Patient Name: Shruthi Christian  MRN: [id number]  Admission Date: 2023  Primary Care Provider: Primary Doctor No    Subjective:     Principal Problem:<principal problem not specified>    History of Present Illness: persistent nausea and vomitting    Obstetric HPI:  Patient reports None contractions, abnormal due to early gestation.  fetal movement, No vaginal bleeding , No loss of fluid     This pregnancy has been complicated by hyperemesis gravidarum    OB History    Para Term  AB Living   1 0 0 0 0 0   SAB IAB Ectopic Multiple Live Births   0 0 0 0 0      # Outcome Date GA Lbr Matt/2nd Weight Sex Delivery Anes PTL Lv   1 Current              Past Medical History:   Diagnosis Date    Anxiety     BECKY (generalized anxiety disorder)     GERD (gastroesophageal reflux disease)     Menorrhagia      Past Surgical History:   Procedure Laterality Date    COLPOSCOPY  2022    WISDOM TOOTH EXTRACTION         PTA Medications   Medication Sig    ondansetron (ZOFRAN-ODT) 4 MG TbDL Take 1 tablet (4 mg total) by mouth every 6 (six) hours as needed.       Review of patient's allergies indicates:  No Known Allergies     Family History       Problem Relation (Age of Onset)    Thyroid disease Mother, Maternal Grandmother          Tobacco Use    Smoking status: Never    Smokeless tobacco: Never   Substance and Sexual Activity    Alcohol use: Not Currently     Comment: social     Drug use: Never    Sexual activity: Yes     Partners: Male     Birth control/protection: None     Review of Systems   Constitutional:  Positive for appetite change.   HENT: Negative.     Eyes: Negative.    Respiratory: Negative.     Cardiovascular: Negative.    Gastrointestinal:  Positive for vomiting.   Endocrine: Negative.    Genitourinary: Negative.    Musculoskeletal: Negative.    Integumentary:  Negative.   Neurological: Negative.    Hematological: Negative.     Psychiatric/Behavioral: Negative.     Breast: negative.     Objective:     Vital Signs (Most Recent):  Temp: 97.9 °F (36.6 °C) (01/22/23 0748)  Pulse: 72 (01/22/23 0748)  Resp: 18 (01/22/23 0748)  BP: (!) 105/56 (01/22/23 0748)  SpO2: 98 % (01/22/23 0748)   Vital Signs (24h Range):  Temp:  [97.8 °F (36.6 °C)-98.3 °F (36.8 °C)] 97.9 °F (36.6 °C)  Pulse:  [] 72  Resp:  [14-20] 18  SpO2:  [98 %-100 %] 98 %  BP: (105-131)/(56-92) 105/56     Weight: 108 kg (238 lb)  Body mass index is 32.28 kg/m².    FHT: 150Cat 0 (not applicable)  TOCO:  Q  minutes    Physical Exam    Cervix:  Dilation:  0  Effacement:  0%  Station: -3  Presentation: Not applicable     Significant Labs:  Lab Results   Component Value Date    GROUPTRH B POS 01/09/2023    HEPBSAG Non-Reactive 01/09/2023       I have personallly reviewed all pertinent lab results from the last 24 hours.  Recent Lab Results         01/22/23  0318   01/22/23  0049   01/21/23  2158   01/21/23  2120        Albumin/Globulin Ratio 0.9       0.9       Albumin 3.1       3.6       Alkaline Phosphatase 114       136              153       Amylase       102       Anion Gap 16       18       Appearance, UA     Clear         AST 68       78       Bacteria, UA     None Seen         Baso #       0.03       Basophil %       0.5       Bilirubin (UA)     0.5         BILIRUBIN TOTAL 0.8       0.9       BUN 5       6       BUN/CREAT RATIO 10       10       Calcium 8.9       9.7       Chloride 103       100       CO2 24       24       Color, UA     Yellow         ID NOW COVID-19, (JENNIFER)   Negative           Creatinine 0.48       0.58       Differential Type       Auto       eGFR 134       128       Eos #       0.03       Eosinophil %       0.5       Globulin, Total 3.4       3.8       Glucose 80       86       Glucose, UA     Normal         HCG Quant       102,891  Comment: Non-pregnant females aged 18 years to 62 years: 1 - 3 mIU/mL    Gestational Age          HCG  mIU/mL  -------------------            -----------------  0.2 - 1 wk:                    5 - 50 mIU/mL  1 - 2 wks:                     50 - 500 mIU/mL  2 - 3 wks:                     100 - 5,000 mIU/mL  3 - 4 wks:                     500 - 10,000 mIU/mL  4 - 5 wks:                     1,000 - 50,000 mIU/mL  5 - 6 wks:                     10,000 - 100,000 mIU/mL  6 - 8 wks:                     15,000 - 200,000 mIU/mL  2 - 3 months:               10,000 - 100,000 mIU/mL       Hematocrit       40.1       Hemoglobin       13.1       Immature Grans (Abs)       0.02       Immature Granulocytes       0.4       Ketones, UA     OVER         Leukocytes, UA     Negative         Lipase 487       464       Lymph #       1.05       Lymph %       19.0       MCH       26.4       MCHC       32.7       MCV       80.8       Mono #       0.58       Mono %       10.5       MPV       10.3       Neutrophils, Abs       3.82       Neutrophils Relative       69.1       NITRITE UA     Negative         nRBC       0.0       NUCLEATED RBC ABSOLUTE       0.00       Occult Blood UA     Negative         pH, UA     6.5         Platelets       283       Potassium 3.5       3.7       PROTEIN TOTAL 6.5       7.4       Protein, UA     100         RBC       4.96       RBC, UA     0-3         RDW       13.1       Sodium 139       138       Specific Gravity, UA     1.031         Squam Epithel, UA     Rare         UROBILINOGEN UA     6         WBC, UA     0-5         WBC       5.53       Yeast, UA     None Seen                 Assessment/Plan:     27 y.o. female  at 12w2d for:    There are no hospital problems to display for this patient.      The patient presented with persistent nausea and vomiting with very elevated liver enzymes.  Plan is to continue IV hydration and antiemetic therapy. Daily CMP.    Mike Romano MD  Obstetrics  [facility]

## 2023-01-22 NOTE — PLAN OF CARE
Problem:  Fall Injury Risk  Goal: Absence of Fall, Infant Drop and Related Injury  Outcome: Ongoing, Progressing     Problem: Adult Inpatient Plan of Care  Goal: Plan of Care Review  Outcome: Ongoing, Progressing  Goal: Patient-Specific Goal (Individualized)  Outcome: Ongoing, Progressing  Goal: Absence of Hospital-Acquired Illness or Injury  Outcome: Ongoing, Progressing  Goal: Optimal Comfort and Wellbeing  Outcome: Ongoing, Progressing  Goal: Readiness for Transition of Care  Outcome: Ongoing, Progressing     Problem: Nausea and Vomiting  Goal: Fluid and Electrolyte Balance  Outcome: Ongoing, Progressing

## 2023-01-22 NOTE — PROGRESS NOTES
[facility]  Obstetrics  Antepartum Progress Note    Patient Name: Shruthi Christian  MRN: [id number]  Admission Date: 1/21/2023  Hospital Length of Stay: 0 days  Attending Physician: Mike Romano MD  Primary Care Provider: Primary Doctor No    Subjective:     Principal Problem:<principal problem not specified>    HPI:     Obstetric HPI:  Patient reports None contractions, abnormal due to early gestation  fetal movement, absent vaginal bleeding , absent loss of fluid      Objective:     Vital Signs (Most Recent):  Temp: 97.9 °F (36.6 °C) (01/22/23 1105)  Pulse: 77 (01/22/23 1105)  Resp: 18 (01/22/23 1105)  BP: 118/62 (01/22/23 1105)  SpO2: 99 % (01/22/23 1105) Vital Signs (24h Range):  Temp:  [97.8 °F (36.6 °C)-98.3 °F (36.8 °C)] 97.9 °F (36.6 °C)  Pulse:  [] 77  Resp:  [14-20] 18  SpO2:  [98 %-100 %] 99 %  BP: (105-131)/(56-92) 118/62     Weight: 108 kg (238 lb)  Body mass index is 32.28 kg/m².    FHT: N/ACat 0 (N/A)  TOCO:  Q N/A minutes      Intake/Output Summary (Last 24 hours) at 1/22/2023 1119  Last data filed at 1/21/2023 2337  Gross per 24 hour   Intake 1000 ml   Output --   Net 1000 ml       Physical Exam:   Constitutional: She is oriented to person, place, and time. She appears well-developed and well-nourished.     Eyes: Pupils are equal, round, and reactive to light. EOM are normal.     Cardiovascular:  Normal rate, regular rhythm and normal heart sounds.             Pulmonary/Chest: Effort normal and breath sounds normal.        Abdominal: Soft. Bowel sounds are normal.     Genitourinary:    Vagina and uterus normal.             Musculoskeletal: Normal range of motion and moves all extremeties.       Neurological: She is alert and oriented to person, place, and time.    Skin: Skin is warm and dry.    Psychiatric: She has a normal mood and affect. Her behavior is normal.     Cervical Exam:  Dilation:  0  Effacement:  0%  Station: -3  Presentation: N/A     Significant  Labs:  Recent Lab Results         01/22/23  0318   01/22/23  0049   01/21/23  2158   01/21/23  2120        Albumin/Globulin Ratio 0.9       0.9       Albumin 3.1       3.6       Alkaline Phosphatase 114       136              153       Amylase       102       Anion Gap 16       18       Appearance, UA     Clear         AST 68       78       Bacteria, UA     None Seen         Baso #       0.03       Basophil %       0.5       Bilirubin (UA)     0.5         BILIRUBIN TOTAL 0.8       0.9       BUN 5       6       BUN/CREAT RATIO 10       10       Calcium 8.9       9.7       Chloride 103       100       CO2 24       24       Color, UA     Yellow         ID NOW COVID-19, (JENNIFER)   Negative           Creatinine 0.48       0.58       Differential Type       Auto       eGFR 134       128       Eos #       0.03       Eosinophil %       0.5       Globulin, Total 3.4       3.8       Glucose 80       86       Glucose, UA     Normal         HCG Quant       102,891  Comment: Non-pregnant females aged 18 years to 62 years: 1 - 3 mIU/mL    Gestational Age          HCG mIU/mL  -------------------            -----------------  0.2 - 1 wk:                    5 - 50 mIU/mL  1 - 2 wks:                     50 - 500 mIU/mL  2 - 3 wks:                     100 - 5,000 mIU/mL  3 - 4 wks:                     500 - 10,000 mIU/mL  4 - 5 wks:                     1,000 - 50,000 mIU/mL  5 - 6 wks:                     10,000 - 100,000 mIU/mL  6 - 8 wks:                     15,000 - 200,000 mIU/mL  2 - 3 months:               10,000 - 100,000 mIU/mL       Hematocrit       40.1       Hemoglobin       13.1       Immature Grans (Abs)       0.02       Immature Granulocytes       0.4       Ketones, UA     OVER         Leukocytes, UA     Negative         Lipase 487       464       Lymph #       1.05       Lymph %       19.0       MCH       26.4       MCHC       32.7       MCV       80.8       Mono #       0.58       Mono %       10.5       MPV        10.3       Neutrophils, Abs       3.82       Neutrophils Relative       69.1       NITRITE UA     Negative         nRBC       0.0       NUCLEATED RBC ABSOLUTE       0.00       Occult Blood UA     Negative         pH, UA     6.5         Platelets       283       Potassium 3.5       3.7       PROTEIN TOTAL 6.5       7.4       Protein, UA     100         RBC       4.96       RBC, UA     0-3         RDW       13.1       Sodium 139       138       Specific Gravity, UA     1.031         Squam Epithel, UA     Rare         UROBILINOGEN UA     6         WBC, UA     0-5         WBC       5.53       Yeast, UA     None Seen                 Assessment/Plan:     27 y.o. female  at 12w2d for:    There are no hospital problems to display for this patient.      The patient presented at 12 weeks gestation with persistent nausea, vomiting and elevated liner enzymes. Gallbladder study is negative for stones.  Continue hydration with antiemetic therapy    Mike Romano MD  Obstetrics  [facility]

## 2023-01-22 NOTE — PLAN OF CARE
Problem:  Fall Injury Risk  Goal: Absence of Fall, Infant Drop and Related Injury  Outcome: Ongoing, Progressing     Problem: Adult Inpatient Plan of Care  Goal: Plan of Care Review  Outcome: Ongoing, Progressing  Flowsheets (Taken 2023 0321)  Plan of Care Reviewed With: patient  Goal: Patient-Specific Goal (Individualized)  Outcome: Ongoing, Progressing  Goal: Absence of Hospital-Acquired Illness or Injury  Outcome: Ongoing, Progressing  Goal: Optimal Comfort and Wellbeing  Outcome: Ongoing, Progressing  Goal: Readiness for Transition of Care  Outcome: Ongoing, Progressing     Problem: Nausea and Vomiting  Goal: Fluid and Electrolyte Balance  Outcome: Ongoing, Progressing

## 2023-01-22 NOTE — CONSULTS
27yoaaf 12 weeks gestation with pmh of obesity who presents with several weeks of NV.  She is feeling better at time of my interview. Medicine consulted for elevated LFTs and NV.  After chart review and history her NV is almost certainly related to her pregnancy.  This began around 6 weeks which would be expected it has become more severe which prompted her presentation to the hospital. Agree with zofran, phenergren, BRAYAN.  Concerning her elevated transaminases- there is mixed pattern in her studies.   She denies ever having had any abdominal pain.  Elevated ast/alt almost certainly related to her obesity and undiagnosed NAFL.  She has recently lost significant weight meaning her prior weight would put her at higher risk for this. ALP is mildly elevated, will check GGT, vitamin D.  Borderline distention of GB on US, sludge/microstones.  I have low suspicion this is causing her symptoms.  I will check some lab work, though I have a low suspicion for infectious or autoimmune etiology. Would follow CMP at subsequent prenatal visits.     If N/V does not resolve as she moves out of the first trimester of her pregnancy or if she develops any abdominal pain surgical evaluation may be needed.      Full consult note follow.

## 2023-01-23 PROBLEM — R74.8 ELEVATED ALKALINE PHOSPHATASE LEVEL: Status: ACTIVE | Noted: 2023-01-23

## 2023-01-23 PROBLEM — R74.01 ELEVATED TRANSAMINASE LEVEL: Status: ACTIVE | Noted: 2023-01-23

## 2023-01-23 PROBLEM — K59.00 CONSTIPATION: Status: ACTIVE | Noted: 2023-01-23

## 2023-01-23 PROBLEM — R11.2 N&V (NAUSEA AND VOMITING): Status: ACTIVE | Noted: 2023-01-23

## 2023-01-23 LAB
ALBUMIN SERPL BCP-MCNC: 2.5 G/DL (ref 3.5–5)
ALBUMIN/GLOB SERPL: 0.7 {RATIO}
ALP SERPL-CCNC: 97 U/L (ref 37–98)
ALT SERPL W P-5'-P-CCNC: 98 U/L (ref 13–56)
ANION GAP SERPL CALCULATED.3IONS-SCNC: 11 MMOL/L (ref 7–16)
AST SERPL W P-5'-P-CCNC: 40 U/L (ref 15–37)
BILIRUB SERPL-MCNC: 0.4 MG/DL (ref ?–1.2)
BUN SERPL-MCNC: 2 MG/DL (ref 7–18)
BUN/CREAT SERPL: 4 (ref 6–20)
CALCIUM SERPL-MCNC: 8.6 MG/DL (ref 8.5–10.1)
CHLORIDE SERPL-SCNC: 107 MMOL/L (ref 98–107)
CO2 SERPL-SCNC: 25 MMOL/L (ref 21–32)
CREAT SERPL-MCNC: 0.51 MG/DL (ref 0.55–1.02)
EGFR (NO RACE VARIABLE) (RUSH/TITUS): 132 ML/MIN/1.73M²
GLOBULIN SER-MCNC: 3.6 G/DL (ref 2–4)
GLUCOSE SERPL-MCNC: 108 MG/DL (ref 74–106)
POTASSIUM SERPL-SCNC: 3.2 MMOL/L (ref 3.5–5.1)
PROT SERPL-MCNC: 6.1 G/DL (ref 6.4–8.2)
SODIUM SERPL-SCNC: 140 MMOL/L (ref 136–145)

## 2023-01-23 PROCEDURE — 96375 TX/PRO/DX INJ NEW DRUG ADDON: CPT

## 2023-01-23 PROCEDURE — G0378 HOSPITAL OBSERVATION PER HR: HCPCS

## 2023-01-23 PROCEDURE — 99232 SBSQ HOSP IP/OBS MODERATE 35: CPT | Mod: ,,, | Performed by: STUDENT IN AN ORGANIZED HEALTH CARE EDUCATION/TRAINING PROGRAM

## 2023-01-23 PROCEDURE — 99232 PR SUBSEQUENT HOSPITAL CARE,LEVL II: ICD-10-PCS | Mod: ,,, | Performed by: STUDENT IN AN ORGANIZED HEALTH CARE EDUCATION/TRAINING PROGRAM

## 2023-01-23 PROCEDURE — 63600175 PHARM REV CODE 636 W HCPCS: Performed by: OBSTETRICS & GYNECOLOGY

## 2023-01-23 PROCEDURE — 25000003 PHARM REV CODE 250: Performed by: OBSTETRICS & GYNECOLOGY

## 2023-01-23 PROCEDURE — 36415 COLL VENOUS BLD VENIPUNCTURE: CPT | Performed by: STUDENT IN AN ORGANIZED HEALTH CARE EDUCATION/TRAINING PROGRAM

## 2023-01-23 PROCEDURE — 96361 HYDRATE IV INFUSION ADD-ON: CPT

## 2023-01-23 PROCEDURE — 63600175 PHARM REV CODE 636 W HCPCS: Performed by: SPECIALIST

## 2023-01-23 PROCEDURE — 80053 COMPREHEN METABOLIC PANEL: CPT | Performed by: STUDENT IN AN ORGANIZED HEALTH CARE EDUCATION/TRAINING PROGRAM

## 2023-01-23 RX ORDER — POTASSIUM CHLORIDE 7.45 MG/ML
40 INJECTION INTRAVENOUS ONCE
Status: COMPLETED | OUTPATIENT
Start: 2023-01-23 | End: 2023-01-23

## 2023-01-23 RX ORDER — ONDANSETRON 4 MG/1
4 TABLET, ORALLY DISINTEGRATING ORAL EVERY 6 HOURS
Status: DISCONTINUED | OUTPATIENT
Start: 2023-01-23 | End: 2023-01-24 | Stop reason: HOSPADM

## 2023-01-23 RX ADMIN — SODIUM CHLORIDE, SODIUM LACTATE, POTASSIUM CHLORIDE, CALCIUM CHLORIDE AND DEXTROSE MONOHYDRATE: 5; 600; 310; 30; 20 INJECTION, SOLUTION INTRAVENOUS at 09:01

## 2023-01-23 RX ADMIN — SODIUM CHLORIDE, SODIUM LACTATE, POTASSIUM CHLORIDE, CALCIUM CHLORIDE AND DEXTROSE MONOHYDRATE: 5; 600; 310; 30; 20 INJECTION, SOLUTION INTRAVENOUS at 11:01

## 2023-01-23 RX ADMIN — ONDANSETRON 4 MG: 4 TABLET, ORALLY DISINTEGRATING ORAL at 05:01

## 2023-01-23 RX ADMIN — POTASSIUM CHLORIDE 40 MEQ: 7.46 INJECTION, SOLUTION INTRAVENOUS at 08:01

## 2023-01-23 RX ADMIN — ONDANSETRON 4 MG: 4 TABLET, ORALLY DISINTEGRATING ORAL at 11:01

## 2023-01-23 RX ADMIN — SODIUM CHLORIDE, SODIUM LACTATE, POTASSIUM CHLORIDE, CALCIUM CHLORIDE AND DEXTROSE MONOHYDRATE: 5; 600; 310; 30; 20 INJECTION, SOLUTION INTRAVENOUS at 05:01

## 2023-01-23 RX ADMIN — SODIUM CHLORIDE, SODIUM LACTATE, POTASSIUM CHLORIDE, CALCIUM CHLORIDE AND DEXTROSE MONOHYDRATE: 5; 600; 310; 30; 20 INJECTION, SOLUTION INTRAVENOUS at 01:01

## 2023-01-23 NOTE — PROGRESS NOTES
Ob progress note    Patient feeling better this a.m.; her nausea is much improved on continuous Zofran; tolerating p.o.; patient currently not vomiting    Vital signs are stable and the patient is afebrile    General alert and oriented x3 no acute distress  Abdomen soft nontender nondistended  No clubbing cyanosis or edema of lower extremities  No vaginal bleeding    Potassium-3.2  ALT-98  AST-40  Lipase-487    Assessment and plan)  1-intrauterine pregnancy at approximately 12 weeks  2-hyperemesis gravidarum-patient improving on Zofran and IV fluids; now tolerating p.o.; will switch to p.o. Zofran continuously  3-hypokalemia-we will replete with 40 mEq potassium chloride IV  4-steatohepatitis-patient's LFTs decreasing; appreciate medicine's assistance with this patient; will repeat labs in a.m.

## 2023-01-23 NOTE — PROGRESS NOTES
[facility] Medicine  Progress Note    Patient Name: Shruthi Christian  MRN: [id number]  Patient Class: OP- Observation   Admission Date: 1/21/2023  Length of Stay: 0 days  Attending Physician: Mike Romano MD  Primary Care Provider: Primary Doctor No        Subjective:     Principal Problem:<principal problem not specified>        HPI:  27yoaaf 12 weeks gestation with pmh of obesity who presents with several weeks of NV.  She is feeling better at time of my interview. Medicine consulted for elevated LFTs and NV.  She has been vomiting up to 5 times daily. No blood, no abdminal pain, no diarrhea. +constipation.  Deneis fever/chills, no personal or family history of AI or liver disease. ROS otherwise negative.       Overview/Hospital Course:  No notes on file    Interval History: naeo    Review of Systems   Constitutional:  Negative for chills, fatigue, fever and unexpected weight change.   HENT:  Negative for congestion, mouth sores and sore throat.    Eyes:  Negative for photophobia and visual disturbance.   Respiratory:  Negative for cough, chest tightness, shortness of breath and wheezing.    Cardiovascular:  Negative for chest pain, palpitations and leg swelling.   Gastrointestinal:  Positive for constipation, nausea and vomiting. Negative for abdominal pain and diarrhea.   Endocrine: Negative for cold intolerance and heat intolerance.   Genitourinary:  Negative for difficulty urinating, dysuria, frequency and urgency.   Musculoskeletal:  Negative for arthralgias, back pain and myalgias.   Skin:  Negative for pallor and rash.   Neurological:  Negative for tremors, seizures, syncope, weakness, numbness and headaches.   Hematological:  Does not bruise/bleed easily.   Psychiatric/Behavioral:  Negative for agitation, confusion, hallucinations and suicidal ideas.    Objective:     Vital Signs (Most Recent):  Temp: 98.6 °F (37 °C) (01/23/23 0730)  Pulse: 68 (01/23/23 0730)  Resp: 20  (01/23/23 0730)  BP: 112/61 (01/23/23 0730)  SpO2: 99 % (01/23/23 0730) Vital Signs (24h Range):  Temp:  [97.6 °F (36.4 °C)-99.3 °F (37.4 °C)] 98.6 °F (37 °C)  Pulse:  [67-77] 68  Resp:  [18-20] 20  SpO2:  [98 %-99 %] 99 %  BP: (102-118)/(59-68) 112/61     Weight: 118.9 kg (262 lb 2 oz)  Body mass index is 35.55 kg/m².    Intake/Output Summary (Last 24 hours) at 1/23/2023 1012  Last data filed at 1/23/2023 0425  Gross per 24 hour   Intake 360 ml   Output --   Net 360 ml      Physical Exam  Vitals reviewed.   Constitutional:       Appearance: Normal appearance.   HENT:      Head: Normocephalic and atraumatic.      Nose: Nose normal.   Eyes:      Extraocular Movements: Extraocular movements intact.      Conjunctiva/sclera: Conjunctivae normal.   Neck:      Trachea: Trachea normal.   Cardiovascular:      Rate and Rhythm: Normal rate and regular rhythm.      Pulses: Normal pulses.      Heart sounds: Normal heart sounds.   Pulmonary:      Effort: Pulmonary effort is normal.      Breath sounds: Normal breath sounds and air entry.   Abdominal:      General: Bowel sounds are normal.      Palpations: Abdomen is soft.   Musculoskeletal:         General: Normal range of motion.      Cervical back: Neck supple.   Skin:     General: Skin is warm and dry.      Capillary Refill: Capillary refill takes less than 2 seconds.   Neurological:      General: No focal deficit present.      Mental Status: She is alert and oriented to person, place, and time.      Cranial Nerves: Cranial nerves 2-12 are intact.      Comments: Grossly normal motor and sensory function without focal deficit appreciated.   Psychiatric:         Mood and Affect: Mood and affect normal.         Behavior: Behavior is cooperative.       Significant Labs: All pertinent labs within the past 24 hours have been reviewed.    Significant Imaging: I have reviewed all pertinent imaging results/findings within the past 24 hours.      Assessment/Plan:      Elevated  transaminase level  Noted US results, still believe this is most likely related to steatohepatitis  Mild elevations that are improving  -possibly a component of gallbladder disease given alp and ggt levels along with borderline distention on US, however with absence of pain I believe N/V related to pregnancy is more likely culprit.  With this being said she does have some evidence of biliary tree pathology and if her symptoms persist beyond the first trimester or if she develops other symptoms consistent with cholecystitis would recommend surgical evaluation.  Recommend CMP at prenatal follow ups    Will follow work up   Ceruloplasmin, ferritin, IgG normal      Elevated alkaline phosphatase level  Along with elevated ggt supports biliary pathology  See above      Constipation  Likely 2/2 zofran      N&V (nausea and vomiting)  Suspect related to pregnancy  See above        VTE Risk Mitigation (From admission, onward)    None          Discharge Planning   SHYANN:      Code Status: Not on file   Is the patient medically ready for discharge?:     Reason for patient still in hospital (select all that apply): Treatment                     Contreras Kerr DO  Department of Hospital Medicine   [facility]

## 2023-01-23 NOTE — SUBJECTIVE & OBJECTIVE
Interval History: naeo    Review of Systems   Constitutional:  Negative for chills, fatigue, fever and unexpected weight change.   HENT:  Negative for congestion, mouth sores and sore throat.    Eyes:  Negative for photophobia and visual disturbance.   Respiratory:  Negative for cough, chest tightness, shortness of breath and wheezing.    Cardiovascular:  Negative for chest pain, palpitations and leg swelling.   Gastrointestinal:  Positive for constipation, nausea and vomiting. Negative for abdominal pain and diarrhea.   Endocrine: Negative for cold intolerance and heat intolerance.   Genitourinary:  Negative for difficulty urinating, dysuria, frequency and urgency.   Musculoskeletal:  Negative for arthralgias, back pain and myalgias.   Skin:  Negative for pallor and rash.   Neurological:  Negative for tremors, seizures, syncope, weakness, numbness and headaches.   Hematological:  Does not bruise/bleed easily.   Psychiatric/Behavioral:  Negative for agitation, confusion, hallucinations and suicidal ideas.    Objective:     Vital Signs (Most Recent):  Temp: 98.6 °F (37 °C) (01/23/23 0730)  Pulse: 68 (01/23/23 0730)  Resp: 20 (01/23/23 0730)  BP: 112/61 (01/23/23 0730)  SpO2: 99 % (01/23/23 0730) Vital Signs (24h Range):  Temp:  [97.6 °F (36.4 °C)-99.3 °F (37.4 °C)] 98.6 °F (37 °C)  Pulse:  [67-77] 68  Resp:  [18-20] 20  SpO2:  [98 %-99 %] 99 %  BP: (102-118)/(59-68) 112/61     Weight: 118.9 kg (262 lb 2 oz)  Body mass index is 35.55 kg/m².    Intake/Output Summary (Last 24 hours) at 1/23/2023 1012  Last data filed at 1/23/2023 0425  Gross per 24 hour   Intake 360 ml   Output --   Net 360 ml      Physical Exam  Vitals reviewed.   Constitutional:       Appearance: Normal appearance.   HENT:      Head: Normocephalic and atraumatic.      Nose: Nose normal.   Eyes:      Extraocular Movements: Extraocular movements intact.      Conjunctiva/sclera: Conjunctivae normal.   Neck:      Trachea: Trachea normal.   Cardiovascular:       Rate and Rhythm: Normal rate and regular rhythm.      Pulses: Normal pulses.      Heart sounds: Normal heart sounds.   Pulmonary:      Effort: Pulmonary effort is normal.      Breath sounds: Normal breath sounds and air entry.   Abdominal:      General: Bowel sounds are normal.      Palpations: Abdomen is soft.   Musculoskeletal:         General: Normal range of motion.      Cervical back: Neck supple.   Skin:     General: Skin is warm and dry.      Capillary Refill: Capillary refill takes less than 2 seconds.   Neurological:      General: No focal deficit present.      Mental Status: She is alert and oriented to person, place, and time.      Cranial Nerves: Cranial nerves 2-12 are intact.      Comments: Grossly normal motor and sensory function without focal deficit appreciated.   Psychiatric:         Mood and Affect: Mood and affect normal.         Behavior: Behavior is cooperative.       Significant Labs: All pertinent labs within the past 24 hours have been reviewed.    Significant Imaging: I have reviewed all pertinent imaging results/findings within the past 24 hours.

## 2023-01-23 NOTE — CONSULTS
[facility] Medicine  Consult Note    Patient Name: Shruthi Christian  MRN: [id number]  Admission Date: 1/21/2023  Hospital Length of Stay: 0 days  Attending Physician: Mike Romano MD   Primary Care Provider: Primary Doctor No           Patient information was obtained from patient, past medical records and ER records.     Consults  Subjective:     Principal Problem: <principal problem not specified>    Chief Complaint:   Chief Complaint   Patient presents with    Vomiting     12 weeks pregnant - pt states has been taking zofran 4mg TID        HPI: 27yoaaf 12 weeks gestation with pmh of obesity who presents with several weeks of NV.  She is feeling better at time of my interview. Medicine consulted for elevated LFTs and NV.  She has been vomiting up to 5 times daily. No blood, no abdminal pain, no diarrhea. +constipation.  Deneis fever/chills, no personal or family history of AI or liver disease. ROS otherwise negative.       Past Medical History:   Diagnosis Date    Anxiety     BECKY (generalized anxiety disorder)     GERD (gastroesophageal reflux disease)     Menorrhagia        Past Surgical History:   Procedure Laterality Date    COLPOSCOPY  April 2022    WISDOM TOOTH EXTRACTION         Review of patient's allergies indicates:  No Known Allergies    No current facility-administered medications on file prior to encounter.     Current Outpatient Medications on File Prior to Encounter   Medication Sig    ondansetron (ZOFRAN-ODT) 4 MG TbDL Take 1 tablet (4 mg total) by mouth every 6 (six) hours as needed.     Family History       Problem Relation (Age of Onset)    Thyroid disease Mother, Maternal Grandmother          Tobacco Use    Smoking status: Never    Smokeless tobacco: Never   Substance and Sexual Activity    Alcohol use: Not Currently     Comment: social     Drug use: Never    Sexual activity: Yes     Partners: Male     Birth control/protection: None     Review of  Systems   Constitutional:  Negative for chills, fatigue, fever and unexpected weight change.   HENT:  Negative for congestion, mouth sores and sore throat.    Eyes:  Negative for photophobia and visual disturbance.   Respiratory:  Negative for cough, chest tightness, shortness of breath and wheezing.    Cardiovascular:  Negative for chest pain, palpitations and leg swelling.   Gastrointestinal:  Positive for constipation, nausea and vomiting. Negative for abdominal pain and diarrhea.   Endocrine: Negative for cold intolerance and heat intolerance.   Genitourinary:  Negative for difficulty urinating, dysuria, frequency and urgency.   Musculoskeletal:  Negative for arthralgias, back pain and myalgias.   Skin:  Negative for pallor and rash.   Neurological:  Negative for tremors, seizures, syncope, weakness, numbness and headaches.   Hematological:  Does not bruise/bleed easily.   Psychiatric/Behavioral:  Negative for agitation, confusion, hallucinations and suicidal ideas.    Objective:     Vital Signs (Most Recent):  Temp: 97.6 °F (36.4 °C) (01/23/23 0425)  Pulse: 71 (01/23/23 0425)  Resp: 20 (01/23/23 0425)  BP: 102/63 (01/23/23 0425)  SpO2: 99 % (01/23/23 0425) Vital Signs (24h Range):  Temp:  [97.6 °F (36.4 °C)-99.3 °F (37.4 °C)] 97.6 °F (36.4 °C)  Pulse:  [67-77] 71  Resp:  [18-20] 20  SpO2:  [98 %-99 %] 99 %  BP: (102-118)/(56-68) 102/63     Weight: 118.9 kg (262 lb 2 oz)  Body mass index is 35.55 kg/m².    Physical Exam  Vitals reviewed.   Constitutional:       Appearance: Normal appearance.   HENT:      Head: Normocephalic and atraumatic.      Nose: Nose normal.   Eyes:      Extraocular Movements: Extraocular movements intact.      Conjunctiva/sclera: Conjunctivae normal.   Neck:      Trachea: Trachea normal.   Cardiovascular:      Rate and Rhythm: Normal rate and regular rhythm.      Pulses: Normal pulses.      Heart sounds: Normal heart sounds.   Pulmonary:      Effort: Pulmonary effort is normal.      Breath  sounds: Normal breath sounds and air entry.   Abdominal:      General: Bowel sounds are normal.      Palpations: Abdomen is soft.   Musculoskeletal:         General: Normal range of motion.      Cervical back: Neck supple.   Skin:     General: Skin is warm and dry.      Capillary Refill: Capillary refill takes less than 2 seconds.   Neurological:      General: No focal deficit present.      Mental Status: She is alert and oriented to person, place, and time.      Cranial Nerves: Cranial nerves 2-12 are intact.      Comments: Grossly normal motor and sensory function without focal deficit appreciated.   Psychiatric:         Mood and Affect: Mood and affect normal.         Behavior: Behavior is cooperative.       Significant Labs: All pertinent labs within the past 24 hours have been reviewed.    Significant Imaging: I have reviewed all pertinent imaging results/findings within the past 24 hours.    Assessment/Plan:     Elevated transaminase level  Noted US results, still believe this is most likely related to steatohepatitis  Mild elevations that are improving  -possibly a component of gallbladder disease given alp and ggt levels along with borderline distention on US, however with absence of pain I believe N/V related to pregnancy is more likely culprit.  With this being said she does have some evidence of biliary tree pathology and if her symptoms persist beyond the first trimester or if she develops other symptoms consistent with cholecystitis would recommend surgical evaluation.  Recommend CMP at prenatal follow ups    Will follow work up   Ceruloplasmin, ferritin, IgG normal      Elevated alkaline phosphatase level  Along with elevated ggt supporst biliary pathology        Constipation  Likely 2/2 zofran      N&V (nausea and vomiting)  Suspect related to pregnancy  See above        VTE Risk Mitigation (From admission, onward)    None              Thank you for your consult. I will follow-up with patient. Please  contact us if you have any additional questions.    Contreras Kerr DO  Department of Hospital Medicine   [facility]

## 2023-01-23 NOTE — SUBJECTIVE & OBJECTIVE
Past Medical History:   Diagnosis Date    Anxiety     BECKY (generalized anxiety disorder)     GERD (gastroesophageal reflux disease)     Menorrhagia        Past Surgical History:   Procedure Laterality Date    COLPOSCOPY  April 2022    WISDOM TOOTH EXTRACTION         Review of patient's allergies indicates:  No Known Allergies    No current facility-administered medications on file prior to encounter.     Current Outpatient Medications on File Prior to Encounter   Medication Sig    ondansetron (ZOFRAN-ODT) 4 MG TbDL Take 1 tablet (4 mg total) by mouth every 6 (six) hours as needed.     Family History       Problem Relation (Age of Onset)    Thyroid disease Mother, Maternal Grandmother          Tobacco Use    Smoking status: Never    Smokeless tobacco: Never   Substance and Sexual Activity    Alcohol use: Not Currently     Comment: social     Drug use: Never    Sexual activity: Yes     Partners: Male     Birth control/protection: None     Review of Systems   Constitutional:  Negative for chills, fatigue, fever and unexpected weight change.   HENT:  Negative for congestion, mouth sores and sore throat.    Eyes:  Negative for photophobia and visual disturbance.   Respiratory:  Negative for cough, chest tightness, shortness of breath and wheezing.    Cardiovascular:  Negative for chest pain, palpitations and leg swelling.   Gastrointestinal:  Positive for constipation, nausea and vomiting. Negative for abdominal pain and diarrhea.   Endocrine: Negative for cold intolerance and heat intolerance.   Genitourinary:  Negative for difficulty urinating, dysuria, frequency and urgency.   Musculoskeletal:  Negative for arthralgias, back pain and myalgias.   Skin:  Negative for pallor and rash.   Neurological:  Negative for tremors, seizures, syncope, weakness, numbness and headaches.   Hematological:  Does not bruise/bleed easily.   Psychiatric/Behavioral:  Negative for agitation, confusion, hallucinations and suicidal ideas.     Objective:     Vital Signs (Most Recent):  Temp: 97.6 °F (36.4 °C) (01/23/23 0425)  Pulse: 71 (01/23/23 0425)  Resp: 20 (01/23/23 0425)  BP: 102/63 (01/23/23 0425)  SpO2: 99 % (01/23/23 0425) Vital Signs (24h Range):  Temp:  [97.6 °F (36.4 °C)-99.3 °F (37.4 °C)] 97.6 °F (36.4 °C)  Pulse:  [67-77] 71  Resp:  [18-20] 20  SpO2:  [98 %-99 %] 99 %  BP: (102-118)/(56-68) 102/63     Weight: 118.9 kg (262 lb 2 oz)  Body mass index is 35.55 kg/m².    Physical Exam  Vitals reviewed.   Constitutional:       Appearance: Normal appearance.   HENT:      Head: Normocephalic and atraumatic.      Nose: Nose normal.   Eyes:      Extraocular Movements: Extraocular movements intact.      Conjunctiva/sclera: Conjunctivae normal.   Neck:      Trachea: Trachea normal.   Cardiovascular:      Rate and Rhythm: Normal rate and regular rhythm.      Pulses: Normal pulses.      Heart sounds: Normal heart sounds.   Pulmonary:      Effort: Pulmonary effort is normal.      Breath sounds: Normal breath sounds and air entry.   Abdominal:      General: Bowel sounds are normal.      Palpations: Abdomen is soft.   Musculoskeletal:         General: Normal range of motion.      Cervical back: Neck supple.   Skin:     General: Skin is warm and dry.      Capillary Refill: Capillary refill takes less than 2 seconds.   Neurological:      General: No focal deficit present.      Mental Status: She is alert and oriented to person, place, and time.      Cranial Nerves: Cranial nerves 2-12 are intact.      Comments: Grossly normal motor and sensory function without focal deficit appreciated.   Psychiatric:         Mood and Affect: Mood and affect normal.         Behavior: Behavior is cooperative.       Significant Labs: All pertinent labs within the past 24 hours have been reviewed.    Significant Imaging: I have reviewed all pertinent imaging results/findings within the past 24 hours.

## 2023-01-23 NOTE — ASSESSMENT & PLAN NOTE
Noted US results, still believe this is most likely related to steatohepatitis  Mild elevations that are improving  -possibly a component of gallbladder disease given alp and ggt levels along with borderline distention on US, however with absence of pain I believe N/V related to pregnancy is more likely culprit.  With this being said she does have some evidence of biliary tree pathology and if her symptoms persist beyond the first trimester or if she develops other symptoms consistent with cholecystitis would recommend surgical evaluation.  Recommend CMP at prenatal follow ups    Will follow work up   Ceruloplasmin, ferritin, IgG normal

## 2023-01-23 NOTE — HPI
27yoaaf 12 weeks gestation with pmh of obesity who presents with several weeks of NV.  She is feeling better at time of my interview. Medicine consulted for elevated LFTs and NV.  She has been vomiting up to 5 times daily. No blood, no abdminal pain, no diarrhea. +constipation.  Deneis fever/chills, no personal or family history of AI or liver disease. ROS otherwise negative.

## 2023-01-24 VITALS
SYSTOLIC BLOOD PRESSURE: 102 MMHG | OXYGEN SATURATION: 99 % | BODY MASS INDEX: 35.5 KG/M2 | TEMPERATURE: 98 F | WEIGHT: 262.13 LBS | DIASTOLIC BLOOD PRESSURE: 58 MMHG | HEIGHT: 72 IN | HEART RATE: 70 BPM | RESPIRATION RATE: 18 BRPM

## 2023-01-24 PROBLEM — Z34.91 FIRST TRIMESTER PREGNANCY: Status: ACTIVE | Noted: 2023-01-24

## 2023-01-24 PROBLEM — Z3A.12 12 WEEKS GESTATION OF PREGNANCY: Status: ACTIVE | Noted: 2023-01-24

## 2023-01-24 LAB
ALBUMIN SERPL BCP-MCNC: 2.5 G/DL (ref 3.5–5)
ALBUMIN/GLOB SERPL: 0.7 {RATIO}
ALP SERPL-CCNC: 91 U/L (ref 37–98)
ALT SERPL W P-5'-P-CCNC: 75 U/L (ref 13–56)
ANA SER QL: NEGATIVE
ANION GAP SERPL CALCULATED.3IONS-SCNC: 10 MMOL/L (ref 7–16)
AST SERPL W P-5'-P-CCNC: 29 U/L (ref 15–37)
BILIRUB SERPL-MCNC: 0.4 MG/DL (ref ?–1.2)
BUN SERPL-MCNC: 1 MG/DL (ref 7–18)
BUN/CREAT SERPL: 2 (ref 6–20)
CALCIUM SERPL-MCNC: 8.8 MG/DL (ref 8.5–10.1)
CHLORIDE SERPL-SCNC: 108 MMOL/L (ref 98–107)
CO2 SERPL-SCNC: 25 MMOL/L (ref 21–32)
CREAT SERPL-MCNC: 0.54 MG/DL (ref 0.55–1.02)
EGFR (NO RACE VARIABLE) (RUSH/TITUS): 130 ML/MIN/1.73M²
GLOBULIN SER-MCNC: 3.6 G/DL (ref 2–4)
GLUCOSE SERPL-MCNC: 107 MG/DL (ref 74–106)
HBV CORE AB SERPL QL IA: NEGATIVE
POTASSIUM SERPL-SCNC: 3.5 MMOL/L (ref 3.5–5.1)
PROT SERPL-MCNC: 6.1 G/DL (ref 6.4–8.2)
SMOOTH MUSCLE AB SER QL IF: NEGATIVE
SODIUM SERPL-SCNC: 139 MMOL/L (ref 136–145)

## 2023-01-24 PROCEDURE — 99232 PR SUBSEQUENT HOSPITAL CARE,LEVL II: ICD-10-PCS | Mod: ,,, | Performed by: STUDENT IN AN ORGANIZED HEALTH CARE EDUCATION/TRAINING PROGRAM

## 2023-01-24 PROCEDURE — G0378 HOSPITAL OBSERVATION PER HR: HCPCS

## 2023-01-24 PROCEDURE — 96361 HYDRATE IV INFUSION ADD-ON: CPT

## 2023-01-24 PROCEDURE — 99232 SBSQ HOSP IP/OBS MODERATE 35: CPT | Mod: ,,, | Performed by: STUDENT IN AN ORGANIZED HEALTH CARE EDUCATION/TRAINING PROGRAM

## 2023-01-24 PROCEDURE — 25000003 PHARM REV CODE 250: Performed by: STUDENT IN AN ORGANIZED HEALTH CARE EDUCATION/TRAINING PROGRAM

## 2023-01-24 PROCEDURE — 80053 COMPREHEN METABOLIC PANEL: CPT | Performed by: STUDENT IN AN ORGANIZED HEALTH CARE EDUCATION/TRAINING PROGRAM

## 2023-01-24 PROCEDURE — 36415 COLL VENOUS BLD VENIPUNCTURE: CPT | Performed by: STUDENT IN AN ORGANIZED HEALTH CARE EDUCATION/TRAINING PROGRAM

## 2023-01-24 PROCEDURE — 25000003 PHARM REV CODE 250: Performed by: OBSTETRICS & GYNECOLOGY

## 2023-01-24 RX ORDER — POTASSIUM CHLORIDE 20 MEQ/1
40 TABLET, EXTENDED RELEASE ORAL ONCE
Status: COMPLETED | OUTPATIENT
Start: 2023-01-24 | End: 2023-01-24

## 2023-01-24 RX ADMIN — ONDANSETRON 4 MG: 4 TABLET, ORALLY DISINTEGRATING ORAL at 11:01

## 2023-01-24 RX ADMIN — ONDANSETRON 4 MG: 4 TABLET, ORALLY DISINTEGRATING ORAL at 05:01

## 2023-01-24 RX ADMIN — POTASSIUM CHLORIDE 40 MEQ: 1500 TABLET, EXTENDED RELEASE ORAL at 11:01

## 2023-01-24 NOTE — SUBJECTIVE & OBJECTIVE
Interval History: naeo    Review of Systems   Constitutional:  Negative for chills, fatigue, fever and unexpected weight change.   HENT:  Negative for congestion, mouth sores and sore throat.    Eyes:  Negative for photophobia and visual disturbance.   Respiratory:  Negative for cough, chest tightness, shortness of breath and wheezing.    Cardiovascular:  Negative for chest pain, palpitations and leg swelling.   Gastrointestinal:  Positive for constipation, nausea and vomiting. Negative for abdominal pain and diarrhea.   Endocrine: Negative for cold intolerance and heat intolerance.   Genitourinary:  Negative for difficulty urinating, dysuria, frequency and urgency.   Musculoskeletal:  Negative for arthralgias, back pain and myalgias.   Skin:  Negative for pallor and rash.   Neurological:  Negative for tremors, seizures, syncope, weakness, numbness and headaches.   Hematological:  Does not bruise/bleed easily.   Psychiatric/Behavioral:  Negative for agitation, confusion, hallucinations and suicidal ideas.    Objective:     Vital Signs (Most Recent):  Temp: 98.2 °F (36.8 °C) (01/24/23 0714)  Pulse: 71 (01/24/23 0714)  Resp: 18 (01/24/23 0714)  BP: (!) 93/53 (01/24/23 0714)  SpO2: 98 % (01/24/23 0714) Vital Signs (24h Range):  Temp:  [98 °F (36.7 °C)-99.8 °F (37.7 °C)] 98.2 °F (36.8 °C)  Pulse:  [71-82] 71  Resp:  [18-20] 18  SpO2:  [97 %-100 %] 98 %  BP: ()/(53-68) 93/53     Weight: 118.9 kg (262 lb 2 oz)  Body mass index is 35.55 kg/m².  No intake or output data in the 24 hours ending 01/24/23 1045     Physical Exam  Vitals reviewed.   Constitutional:       Appearance: Normal appearance.   HENT:      Head: Normocephalic and atraumatic.      Nose: Nose normal.   Eyes:      Extraocular Movements: Extraocular movements intact.      Conjunctiva/sclera: Conjunctivae normal.   Neck:      Trachea: Trachea normal.   Cardiovascular:      Rate and Rhythm: Normal rate and regular rhythm.      Pulses: Normal pulses.       Heart sounds: Normal heart sounds.   Pulmonary:      Effort: Pulmonary effort is normal.      Breath sounds: Normal breath sounds and air entry.   Abdominal:      General: Bowel sounds are normal.      Palpations: Abdomen is soft.   Musculoskeletal:         General: Normal range of motion.      Cervical back: Neck supple.   Skin:     General: Skin is warm and dry.      Capillary Refill: Capillary refill takes less than 2 seconds.   Neurological:      General: No focal deficit present.      Mental Status: She is alert and oriented to person, place, and time.      Cranial Nerves: Cranial nerves 2-12 are intact.      Comments: Grossly normal motor and sensory function without focal deficit appreciated.   Psychiatric:         Mood and Affect: Mood and affect normal.         Behavior: Behavior is cooperative.       Significant Labs: All pertinent labs within the past 24 hours have been reviewed.    Significant Imaging: I have reviewed all pertinent imaging results/findings within the past 24 hours.

## 2023-01-24 NOTE — ASSESSMENT & PLAN NOTE
Noted US results, still believe this is most likely related to steatohepatitis  Mild elevations that are improving  -possibly a component of gallbladder disease given alp and ggt levels along with borderline distention on US, however with absence of pain I believe N/V related to pregnancy is more likely culprit.  With this being said she does have some evidence of biliary tree pathology and if her symptoms persist beyond the first trimester or if she develops other symptoms consistent with cholecystitis would recommend surgical evaluation.  Recommend CMP at prenatal follow ups    AI work up pending-low suspicion  Ceruloplasmin, ferritin, IgG normal  Hep a,b,c negative

## 2023-01-24 NOTE — NURSING
Pt's IV infiltrated.  Alerted Dr. Simmons to see if I needed to stick her again since she will be dc'd today.  Stated that I did not have to stick again.  Positioned for comfort and safety.  Care ongoing.

## 2023-01-24 NOTE — PROGRESS NOTES
[facility] Medicine  Progress Note    Patient Name: Shruthi Christian  MRN: [id number]  Patient Class: OP- Observation   Admission Date: 1/21/2023  Length of Stay: 0 days  Attending Physician: Mike Romano MD  Primary Care Provider: Primary Doctor No        Subjective:     Principal Problem:<principal problem not specified>        HPI:  27yoaaf 12 weeks gestation with pmh of obesity who presents with several weeks of NV.  She is feeling better at time of my interview. Medicine consulted for elevated LFTs and NV.  She has been vomiting up to 5 times daily. No blood, no abdminal pain, no diarrhea. +constipation.  Deneis fever/chills, no personal or family history of AI or liver disease. ROS otherwise negative.       Overview/Hospital Course:  No notes on file    Interval History: naeo    Review of Systems   Constitutional:  Negative for chills, fatigue, fever and unexpected weight change.   HENT:  Negative for congestion, mouth sores and sore throat.    Eyes:  Negative for photophobia and visual disturbance.   Respiratory:  Negative for cough, chest tightness, shortness of breath and wheezing.    Cardiovascular:  Negative for chest pain, palpitations and leg swelling.   Gastrointestinal:  Positive for constipation, nausea and vomiting. Negative for abdominal pain and diarrhea.   Endocrine: Negative for cold intolerance and heat intolerance.   Genitourinary:  Negative for difficulty urinating, dysuria, frequency and urgency.   Musculoskeletal:  Negative for arthralgias, back pain and myalgias.   Skin:  Negative for pallor and rash.   Neurological:  Negative for tremors, seizures, syncope, weakness, numbness and headaches.   Hematological:  Does not bruise/bleed easily.   Psychiatric/Behavioral:  Negative for agitation, confusion, hallucinations and suicidal ideas.    Objective:     Vital Signs (Most Recent):  Temp: 98.2 °F (36.8 °C) (01/24/23 0714)  Pulse: 71 (01/24/23 0714)  Resp: 18  (01/24/23 0714)  BP: (!) 93/53 (01/24/23 0714)  SpO2: 98 % (01/24/23 0714) Vital Signs (24h Range):  Temp:  [98 °F (36.7 °C)-99.8 °F (37.7 °C)] 98.2 °F (36.8 °C)  Pulse:  [71-82] 71  Resp:  [18-20] 18  SpO2:  [97 %-100 %] 98 %  BP: ()/(53-68) 93/53     Weight: 118.9 kg (262 lb 2 oz)  Body mass index is 35.55 kg/m².  No intake or output data in the 24 hours ending 01/24/23 1045     Physical Exam  Vitals reviewed.   Constitutional:       Appearance: Normal appearance.   HENT:      Head: Normocephalic and atraumatic.      Nose: Nose normal.   Eyes:      Extraocular Movements: Extraocular movements intact.      Conjunctiva/sclera: Conjunctivae normal.   Neck:      Trachea: Trachea normal.   Cardiovascular:      Rate and Rhythm: Normal rate and regular rhythm.      Pulses: Normal pulses.      Heart sounds: Normal heart sounds.   Pulmonary:      Effort: Pulmonary effort is normal.      Breath sounds: Normal breath sounds and air entry.   Abdominal:      General: Bowel sounds are normal.      Palpations: Abdomen is soft.   Musculoskeletal:         General: Normal range of motion.      Cervical back: Neck supple.   Skin:     General: Skin is warm and dry.      Capillary Refill: Capillary refill takes less than 2 seconds.   Neurological:      General: No focal deficit present.      Mental Status: She is alert and oriented to person, place, and time.      Cranial Nerves: Cranial nerves 2-12 are intact.      Comments: Grossly normal motor and sensory function without focal deficit appreciated.   Psychiatric:         Mood and Affect: Mood and affect normal.         Behavior: Behavior is cooperative.       Significant Labs: All pertinent labs within the past 24 hours have been reviewed.    Significant Imaging: I have reviewed all pertinent imaging results/findings within the past 24 hours.      Assessment/Plan:      Elevated transaminase level  Noted US results, still believe this is most likely related to  steatohepatitis  Mild elevations that are improving  -possibly a component of gallbladder disease given alp and ggt levels along with borderline distention on US, however with absence of pain I believe N/V related to pregnancy is more likely culprit.  With this being said she does have some evidence of biliary tree pathology and if her symptoms persist beyond the first trimester or if she develops other symptoms consistent with cholecystitis would recommend surgical evaluation.  Recommend CMP at prenatal follow ups    AI work up pending-low suspicion  Ceruloplasmin, ferritin, IgG normal  Hep a,b,c negative      Elevated alkaline phosphatase level  Along with elevated ggt supports biliary pathology  See above      Constipation  Likely 2/2 zofran      N&V (nausea and vomiting)  Suspect related to pregnancy  See above        VTE Risk Mitigation (From admission, onward)    None          Discharge Planning   SHYANN: 1/24/2023     Code Status: Not on file   Is the patient medically ready for discharge?:     Reason for patient still in hospital (select all that apply): Treatment                     Contreras Kerr DO  Department of Hospital Medicine   [facility]

## 2023-01-25 NOTE — SUBJECTIVE & OBJECTIVE
Obstetric HPI:  Patient reports None contractions,  too early  fetal movement, absent vaginal bleeding , absent loss of fluid      Objective:     Vital Signs (Most Recent):  Temp: 98.2 °F (36.8 °C) (01/24/23 1144)  Pulse: 70 (01/24/23 1144)  Resp: 18 (01/24/23 1144)  BP: (!) 102/58 (01/24/23 1144)  SpO2: 99 % (01/24/23 1144)   Vital Signs (24h Range):  Temp:  [98 °F (36.7 °C)-98.2 °F (36.8 °C)] 98.2 °F (36.8 °C)  Pulse:  [70-75] 70  Resp:  [18] 18  SpO2:  [98 %-99 %] 99 %  BP: ()/(53-68) 102/58     Weight: 118.9 kg (262 lb 2 oz)  Body mass index is 35.55 kg/m².      No intake or output data in the 24 hours ending 01/25/23 0155      Significant Labs:  Recent Lab Results         01/24/23  0246        Albumin/Globulin Ratio 0.7       Albumin 2.5       Alkaline Phosphatase 91       ALT 75       Anion Gap 10       AST 29       BILIRUBIN TOTAL 0.4       BUN 1       BUN/CREAT RATIO 2       Calcium 8.8       Chloride 108       CO2 25       Creatinine 0.54       eGFR 130       Globulin, Total 3.6       Glucose 107       Potassium 3.5       PROTEIN TOTAL 6.1       Sodium 139               Physical Exam:   Constitutional: She appears well-developed and well-nourished. No distress.       Cardiovascular:  Normal rate and regular rhythm.             Pulmonary/Chest: Effort normal. No respiratory distress.        Abdominal: Soft. Bowel sounds are normal. There is no abdominal tenderness.             Musculoskeletal: Moves all extremeties.       Neurological: She is alert. She has normal reflexes.     Psychiatric: She has a normal mood and affect. Her behavior is normal.     Review of Systems

## 2023-01-25 NOTE — DISCHARGE SUMMARY
[facility] - Short Stay Unit  Obstetrics  Discharge Summary      Patient Name: Shruthi Christian  MRN: [id number]  Admission Date: 1/21/2023  Hospital Length of Stay: 0 days  Discharge Date and Time: 1/24/2023  Attending Physician: No att. providers found   Discharging Provider: Artemio Irizarry MD   Primary Care Provider: Primary Doctor No    HPI: No notes on file    * No surgery found *     Hospital Course:    Vomiting       12 weeks pregnant - pt states has been taking zofran 4mg TID         HPI: 27yoaaf 12 weeks gestation with pmh of obesity who presents with several weeks of NV.  She is feeling better at time of my interview. Medicine consulted for elevated LFTs and NV.  She has been vomiting up to 5 times daily. No blood, no abdminal pain, no diarrhea. +constipation.  Deneis fever/chills, no personal or family history of AI or liver disease. ROS otherwise negative.              Past Medical History:   Diagnosis Date    Anxiety      BECKY (generalized anxiety disorder)      GERD (gastroesophageal reflux disease)      Menorrhagia      Patient responded to IV hydration and antiemetic medication.  She had liver enzymes returning to normal by time of discharge.  She was less nauseous and had not vomited and was discharged home with follow-up in 1 week.           Final Active Diagnoses:    Diagnosis Date Noted POA    PRINCIPAL PROBLEM:  Elevated transaminase level [R74.01] 01/23/2023 Yes    12 weeks gestation of pregnancy [Z3A.12] 01/24/2023 Not Applicable    N&V (nausea and vomiting) [R11.2] 01/23/2023 Yes    Constipation [K59.00] 01/23/2023 Yes    Elevated alkaline phosphatase level [R74.8] 01/23/2023 Yes    BMI 36.0-36.9,adult [Z68.36] 10/17/2022 Not Applicable      Problems Resolved During this Admission:        Significant Diagnostic Studies: Labs:   BMP:   Recent Labs   Lab 01/23/23  0407 01/24/23  0246   * 107*    139   K 3.2* 3.5    108*   CO2 25 25   BUN 2* 1*   CREATININE  0.51* 0.54*   CALCIUM 8.6 8.8   , CMP   Recent Labs   Lab 01/23/23  0407 01/24/23  0246    139   K 3.2* 3.5    108*   CO2 25 25   * 107*   BUN 2* 1*   CREATININE 0.51* 0.54*   CALCIUM 8.6 8.8   PROT 6.1* 6.1*   ALBUMIN 2.5* 2.5*   BILITOT 0.4 0.4   ALKPHOS 97 91   AST 40* 29   ALT 98* 75*   ANIONGAP 11 10    and CBC No results for input(s): WBC, HGB, HCT, PLT in the last 48 hours.  Radiology: Ultrasound:        Assessment/Plan:      Elevated transaminase level  Noted US results, still believe this is most likely related to steatohepatitis  Mild elevations that are improving  -possibly a component of gallbladder disease given alp and ggt levels along with borderline distention on US, however with absence of pain I believe N/V related to pregnancy is more likely culprit.  With this being said she does have some evidence of biliary tree pathology and if her symptoms persist beyond the first trimester or if she develops other symptoms consistent with cholecystitis would recommend surgical evaluation.  Recommend CMP at prenatal follow ups     AI work up pending-low suspicion  Ceruloplasmin, ferritin, IgG normal  Hep a,b,c negative        Elevated alkaline phosphatase level  Along with elevated ggt supports biliary pathology  See above        Constipation  Likely 2/2 zofran        N&V (nausea and vomiting)  Suspect related to pregnancy  See above               VTE Risk Mitigation (From admission, onward)     None             Discharge Planning   SHYANN: 1/24/2023     Code Status: Not on file   Is the patient medically ready for discharge?:     Reason for patient still in hospital (select all that apply): Treatment            Immunizations     None          This patient has no babies on file.  Pending Diagnostic Studies:     None          Discharged Condition: stable    Disposition: Home or Self Care    Follow Up:   Follow-up Information     Stefano Wilkerson, DO Follow up in 1 week(s).    Specialty:  Obstetrics and Gynecology  Contact information:  [address] [place] [address] [place] [id number]  [phone]                       Patient Instructions:   No discharge procedures on file.  Medications:  Discharge Medication List as of 1/24/2023  3:31 PM      CONTINUE these medications which have NOT CHANGED    Details   ondansetron (ZOFRAN-ODT) 4 MG TbDL Take 1 tablet (4 mg total) by mouth every 6 (six) hours as needed., Starting Thu 1/12/2023, Normal             Artemio Irizarry MD  Obstetrics  [facility] - Short Stay Unit

## 2023-01-25 NOTE — HOSPITAL COURSE
Vomiting       12 weeks pregnant - pt states has been taking zofran 4mg TID         HPI: 27yoaaf 12 weeks gestation with pmh of obesity who presents with several weeks of NV.  She is feeling better at time of my interview. Medicine consulted for elevated LFTs and NV.  She has been vomiting up to 5 times daily. No blood, no abdminal pain, no diarrhea. +constipation.  Deneis fever/chills, no personal or family history of AI or liver disease. ROS otherwise negative.              Past Medical History:   Diagnosis Date    Anxiety      BECKY (generalized anxiety disorder)      GERD (gastroesophageal reflux disease)      Menorrhagia      Patient responded to IV hydration and antiemetic medication.  She had liver enzymes returning to normal by time of discharge.  She was less nauseous and had not vomited and was discharged home with follow-up in 1 week.

## 2023-01-25 NOTE — PROGRESS NOTES
[facility] - Short Stay Unit  Obstetrics  Postpartum Progress Note    Patient Name: Shruthi Christian  MRN: [id number]  Admission Date: 1/21/2023  Hospital Length of Stay: 0 days  Attending Physician: No att. providers found  Primary Care Provider: Primary Doctor No    Subjective:     Principal Problem:Elevated transaminase level    Hospital Course:   Vomiting       12 weeks pregnant - pt states has been taking zofran 4mg TID         HPI: 27yoaaf 12 weeks gestation with pmh of obesity who presents with several weeks of NV.  She is feeling better at time of my interview. Medicine consulted for elevated LFTs and NV.  She has been vomiting up to 5 times daily. No blood, no abdminal pain, no diarrhea. +constipation.  Deneis fever/chills, no personal or family history of AI or liver disease. ROS otherwise negative.              Past Medical History:   Diagnosis Date    Anxiety      BECKY (generalized anxiety disorder)      GERD (gastroesophageal reflux disease)      Menorrhagia      Patient responded to IV hydration and antiemetic medication.  She had liver enzymes returning to normal by time of discharge.  She was less nauseous and had not vomited and was discharged home with follow-up in 1 week.      Obstetric HPI:  Patient reports None contractions,  too early  fetal movement, absent vaginal bleeding , absent loss of fluid      Objective:     Vital Signs (Most Recent):  Temp: 98.2 °F (36.8 °C) (01/24/23 1144)  Pulse: 70 (01/24/23 1144)  Resp: 18 (01/24/23 1144)  BP: (!) 102/58 (01/24/23 1144)  SpO2: 99 % (01/24/23 1144)   Vital Signs (24h Range):  Temp:  [98 °F (36.7 °C)-98.2 °F (36.8 °C)] 98.2 °F (36.8 °C)  Pulse:  [70-75] 70  Resp:  [18] 18  SpO2:  [98 %-99 %] 99 %  BP: ()/(53-68) 102/58     Weight: 118.9 kg (262 lb 2 oz)  Body mass index is 35.55 kg/m².      No intake or output data in the 24 hours ending 01/25/23 5759      Significant Labs:  Recent Lab Results         01/24/23  4039         Albumin/Globulin Ratio 0.7       Albumin 2.5       Alkaline Phosphatase 91       ALT 75       Anion Gap 10       AST 29       BILIRUBIN TOTAL 0.4       BUN 1       BUN/CREAT RATIO 2       Calcium 8.8       Chloride 108       CO2 25       Creatinine 0.54       eGFR 130       Globulin, Total 3.6       Glucose 107       Potassium 3.5       PROTEIN TOTAL 6.1       Sodium 139               Physical Exam:   Constitutional: She appears well-developed and well-nourished. No distress.       Cardiovascular:  Normal rate and regular rhythm.             Pulmonary/Chest: Effort normal. No respiratory distress.        Abdominal: Soft. Bowel sounds are normal. There is no abdominal tenderness.             Musculoskeletal: Moves all extremeties.       Neurological: She is alert. She has normal reflexes.     Psychiatric: She has a normal mood and affect. Her behavior is normal.     Review of Systems    Assessment/Plan:     27 y.o. female  for:    No notes have been filed under this hospital service.  Service: Obstetrics and Gynecology      Disposition: As patient meets milestones, will plan to discharge home.    Artemio Irizarry MD  Obstetrics  [facility] - Short Stay Unit

## 2023-02-06 ENCOUNTER — ROUTINE PRENATAL (OUTPATIENT)
Dept: OBSTETRICS AND GYNECOLOGY | Facility: CLINIC | Age: 28
End: 2023-02-06
Payer: COMMERCIAL

## 2023-02-06 VITALS
DIASTOLIC BLOOD PRESSURE: 77 MMHG | SYSTOLIC BLOOD PRESSURE: 128 MMHG | HEART RATE: 90 BPM | WEIGHT: 238 LBS | BODY MASS INDEX: 32.28 KG/M2

## 2023-02-06 DIAGNOSIS — O21.9 NAUSEA/VOMITING IN PREGNANCY: Primary | ICD-10-CM

## 2023-02-06 DIAGNOSIS — Z3A.14 14 WEEKS GESTATION OF PREGNANCY: ICD-10-CM

## 2023-02-06 LAB
BILIRUB SERPL-MCNC: NORMAL MG/DL
BLOOD URINE, POC: NORMAL
COLOR, POC UA: NORMAL
GLUCOSE UR QL STRIP: NORMAL
KETONES UR QL STRIP: NORMAL
LEUKOCYTE ESTERASE URINE, POC: NORMAL
NITRITE, POC UA: NORMAL
PH, POC UA: 7.5
PROTEIN, POC: NORMAL
SPECIFIC GRAVITY, POC UA: 1.01
UROBILINOGEN, POC UA: 4

## 2023-02-06 PROCEDURE — 0502F PR SUBSEQUENT PRENATAL CARE: ICD-10-PCS | Mod: CPTII,,, | Performed by: STUDENT IN AN ORGANIZED HEALTH CARE EDUCATION/TRAINING PROGRAM

## 2023-02-06 PROCEDURE — 99213 OFFICE O/P EST LOW 20 MIN: CPT | Mod: PBBFAC | Performed by: STUDENT IN AN ORGANIZED HEALTH CARE EDUCATION/TRAINING PROGRAM

## 2023-02-06 PROCEDURE — 81003 URINALYSIS AUTO W/O SCOPE: CPT | Mod: PBBFAC | Performed by: STUDENT IN AN ORGANIZED HEALTH CARE EDUCATION/TRAINING PROGRAM

## 2023-02-06 PROCEDURE — 0502F SUBSEQUENT PRENATAL CARE: CPT | Mod: CPTII,,, | Performed by: STUDENT IN AN ORGANIZED HEALTH CARE EDUCATION/TRAINING PROGRAM

## 2023-02-06 NOTE — PROGRESS NOTES
Return OB Visit    27 y.o. female  at 14w3d   She c/o nausea.  C/o vaginal odor being different.  Denies any irritation or discharge.   Reports no fetal movement or fluttering, too early for gestational age. Denies any vaginal bleeding, leakage of fluid, cramping, contractions, or pressure.   Total weight gain/weight loss in pregnancy: Not found.     Vitals  BP: 128/77  Pulse: 90  Weight: 108 kg (238 lb)  Prenatal  Fetal Heart Rate: 140    Prenatal Labs:  Lab Results   Component Value Date    GROUPTRH B POS 2023    HGB 13.1 2023    HCT 40.1 2023     2023    SICKLE Negative 2023    HEPBSAG Non-Reactive 2023    ZAV20WYPP Non-Reactive 2023    LABNGO Negative 2023    LABURIN (A) 2023     30,000 Acinetobacter baumannii complex/haemolyticus    LABURIN 28,000 Enterococcus faecalis (A) 2023       A: 14w3d           ICD-10-CM ICD-9-CM    1. Nausea/vomiting in pregnancy  O21.9 643.90       2. 14 weeks gestation of pregnancy  Z3A.14 V22.2 POCT URINALYSIS W/O SCOPE          P: Bleeding and  labor/labor precautions discussed.    The following were addressed during this visit:    13-16 Weeks  - Quad screen   - Anatomy Ultrasound   - Breastfeeding Concerns & Resources   - Importance of Early Skin to Skin Contact       Orders Placed This Encounter   Procedures    POCT URINALYSIS W/O SCOPE     NIPT discussed and pt is going to contact her insurance.  Questions answered to desired level of satisfaction  Verbalized understanding to all information and instructions provided.  Follow up in about 4 weeks (around 3/6/2023) for OBV.    Alyse Boswell, DO OBGYN [facility]

## 2023-02-10 PROBLEM — O21.9 NAUSEA/VOMITING IN PREGNANCY: Status: ACTIVE | Noted: 2023-02-10

## 2023-10-23 ENCOUNTER — OFFICE VISIT (OUTPATIENT)
Dept: FAMILY MEDICINE | Facility: CLINIC | Age: 28
End: 2023-10-23
Payer: COMMERCIAL

## 2023-10-23 VITALS
HEART RATE: 88 BPM | OXYGEN SATURATION: 99 % | TEMPERATURE: 99 F | RESPIRATION RATE: 20 BRPM | HEIGHT: 72 IN | SYSTOLIC BLOOD PRESSURE: 125 MMHG | WEIGHT: 260 LBS | BODY MASS INDEX: 35.21 KG/M2 | DIASTOLIC BLOOD PRESSURE: 68 MMHG

## 2023-10-23 DIAGNOSIS — Z11.3 SCREEN FOR STD (SEXUALLY TRANSMITTED DISEASE): Primary | ICD-10-CM

## 2023-10-23 DIAGNOSIS — N89.8 VAGINAL DISCHARGE: ICD-10-CM

## 2023-10-23 PROBLEM — R11.2 N&V (NAUSEA AND VOMITING): Status: RESOLVED | Noted: 2023-01-23 | Resolved: 2023-10-23

## 2023-10-23 PROBLEM — Z3A.12 12 WEEKS GESTATION OF PREGNANCY: Status: RESOLVED | Noted: 2023-01-24 | Resolved: 2023-10-23

## 2023-10-23 PROBLEM — O21.9 NAUSEA/VOMITING IN PREGNANCY: Status: RESOLVED | Noted: 2023-02-10 | Resolved: 2023-10-23

## 2023-10-23 LAB
B-HCG UR QL: NEGATIVE
BILIRUB SERPL-MCNC: NEGATIVE MG/DL
BLOOD URINE, POC: NEGATIVE
CANDIDA SPECIES: NEGATIVE
COLOR, POC UA: YELLOW
CTP QC/QA: YES
GARDNERELLA: NEGATIVE
GLUCOSE UR QL STRIP: NEGATIVE
HCV AB SER QL: NORMAL
HIV 1+O+2 AB SERPL QL: NORMAL
KETONES UR QL STRIP: NEGATIVE
LEUKOCYTE ESTERASE URINE, POC: NEGATIVE
NITRITE, POC UA: NEGATIVE
PH, POC UA: 6.5
PROTEIN, POC: ABNORMAL
SPECIFIC GRAVITY, POC UA: 1.03
TRICHOMONAS: NEGATIVE
UROBILINOGEN, POC UA: 0.2

## 2023-10-23 PROCEDURE — 87660 TRICHOMONAS VAGIN DIR PROBE: CPT | Mod: ,,, | Performed by: CLINICAL MEDICAL LABORATORY

## 2023-10-23 PROCEDURE — 99213 OFFICE O/P EST LOW 20 MIN: CPT | Mod: ,,, | Performed by: NURSE PRACTITIONER

## 2023-10-23 PROCEDURE — 87491 CHLMYD TRACH DNA AMP PROBE: CPT | Mod: ,,, | Performed by: CLINICAL MEDICAL LABORATORY

## 2023-10-23 PROCEDURE — 87510 GARDNER VAG DNA DIR PROBE: CPT | Mod: ,,, | Performed by: CLINICAL MEDICAL LABORATORY

## 2023-10-23 PROCEDURE — 81003 POCT URINALYSIS W/O SCOPE: ICD-10-PCS | Mod: QW,,, | Performed by: NURSE PRACTITIONER

## 2023-10-23 PROCEDURE — 99213 PR OFFICE/OUTPT VISIT, EST, LEVL III, 20-29 MIN: ICD-10-PCS | Mod: ,,, | Performed by: NURSE PRACTITIONER

## 2023-10-23 PROCEDURE — 3008F PR BODY MASS INDEX (BMI) DOCUMENTED: ICD-10-PCS | Mod: CPTII,,, | Performed by: NURSE PRACTITIONER

## 2023-10-23 PROCEDURE — 3074F PR MOST RECENT SYSTOLIC BLOOD PRESSURE < 130 MM HG: ICD-10-PCS | Mod: CPTII,,, | Performed by: NURSE PRACTITIONER

## 2023-10-23 PROCEDURE — 86695 HERPES SIMPLEX TYPE 1 TEST: CPT | Mod: ,,, | Performed by: CLINICAL MEDICAL LABORATORY

## 2023-10-23 PROCEDURE — 87480 BACTERIAL VAGINOSIS: ICD-10-PCS | Mod: ,,, | Performed by: CLINICAL MEDICAL LABORATORY

## 2023-10-23 PROCEDURE — 86696 HERPES SIMPLEX 1 & 2 IGG: ICD-10-PCS | Mod: ,,, | Performed by: CLINICAL MEDICAL LABORATORY

## 2023-10-23 PROCEDURE — 87591 N.GONORRHOEAE DNA AMP PROB: CPT | Mod: ,,, | Performed by: CLINICAL MEDICAL LABORATORY

## 2023-10-23 PROCEDURE — 1160F PR REVIEW ALL MEDS BY PRESCRIBER/CLIN PHARMACIST DOCUMENTED: ICD-10-PCS | Mod: CPTII,,, | Performed by: NURSE PRACTITIONER

## 2023-10-23 PROCEDURE — 3008F BODY MASS INDEX DOCD: CPT | Mod: CPTII,,, | Performed by: NURSE PRACTITIONER

## 2023-10-23 PROCEDURE — 87389 HIV-1 AG W/HIV-1&-2 AB AG IA: CPT | Mod: ,,, | Performed by: CLINICAL MEDICAL LABORATORY

## 2023-10-23 PROCEDURE — 87660 BACTERIAL VAGINOSIS: ICD-10-PCS | Mod: ,,, | Performed by: CLINICAL MEDICAL LABORATORY

## 2023-10-23 PROCEDURE — 3074F SYST BP LT 130 MM HG: CPT | Mod: CPTII,,, | Performed by: NURSE PRACTITIONER

## 2023-10-23 PROCEDURE — 86803 HEPATITIS C ANTIBODY: ICD-10-PCS | Mod: ,,, | Performed by: CLINICAL MEDICAL LABORATORY

## 2023-10-23 PROCEDURE — 87591 CHLAMYDIA/GONORRHOEAE(GC), PCR: ICD-10-PCS | Mod: ,,, | Performed by: CLINICAL MEDICAL LABORATORY

## 2023-10-23 PROCEDURE — 1159F MED LIST DOCD IN RCRD: CPT | Mod: CPTII,,, | Performed by: NURSE PRACTITIONER

## 2023-10-23 PROCEDURE — 86696 HERPES SIMPLEX TYPE 2 TEST: CPT | Mod: ,,, | Performed by: CLINICAL MEDICAL LABORATORY

## 2023-10-23 PROCEDURE — 81025 URINE PREGNANCY TEST: CPT | Mod: QW,,, | Performed by: NURSE PRACTITIONER

## 2023-10-23 PROCEDURE — 87480 CANDIDA DNA DIR PROBE: CPT | Mod: ,,, | Performed by: CLINICAL MEDICAL LABORATORY

## 2023-10-23 PROCEDURE — 81003 URINALYSIS AUTO W/O SCOPE: CPT | Mod: QW,,, | Performed by: NURSE PRACTITIONER

## 2023-10-23 PROCEDURE — 86695 HERPES SIMPLEX 1 & 2 IGG: ICD-10-PCS | Mod: ,,, | Performed by: CLINICAL MEDICAL LABORATORY

## 2023-10-23 PROCEDURE — 3078F PR MOST RECENT DIASTOLIC BLOOD PRESSURE < 80 MM HG: ICD-10-PCS | Mod: CPTII,,, | Performed by: NURSE PRACTITIONER

## 2023-10-23 PROCEDURE — 81025 POCT URINE PREGNANCY: ICD-10-PCS | Mod: QW,,, | Performed by: NURSE PRACTITIONER

## 2023-10-23 PROCEDURE — 87389 HIV 1 / 2 ANTIBODY: ICD-10-PCS | Mod: ,,, | Performed by: CLINICAL MEDICAL LABORATORY

## 2023-10-23 PROCEDURE — 1160F RVW MEDS BY RX/DR IN RCRD: CPT | Mod: CPTII,,, | Performed by: NURSE PRACTITIONER

## 2023-10-23 PROCEDURE — 87510 BACTERIAL VAGINOSIS: ICD-10-PCS | Mod: ,,, | Performed by: CLINICAL MEDICAL LABORATORY

## 2023-10-23 PROCEDURE — 87491 CHLAMYDIA/GONORRHOEAE(GC), PCR: ICD-10-PCS | Mod: ,,, | Performed by: CLINICAL MEDICAL LABORATORY

## 2023-10-23 PROCEDURE — 1159F PR MEDICATION LIST DOCUMENTED IN MEDICAL RECORD: ICD-10-PCS | Mod: CPTII,,, | Performed by: NURSE PRACTITIONER

## 2023-10-23 PROCEDURE — 86803 HEPATITIS C AB TEST: CPT | Mod: ,,, | Performed by: CLINICAL MEDICAL LABORATORY

## 2023-10-23 PROCEDURE — 3078F DIAST BP <80 MM HG: CPT | Mod: CPTII,,, | Performed by: NURSE PRACTITIONER

## 2023-10-23 RX ORDER — METRONIDAZOLE 7.5 MG/G
1 GEL VAGINAL NIGHTLY
COMMUNITY
Start: 2023-06-30 | End: 2023-10-23

## 2023-10-23 NOTE — PROGRESS NOTES
Kaity Ybarra, MERE        PATIENT NAME: Shruthi Christian  : 1995  DATE: 10/23/23  MRN: [id number]      Patient PCP Information       Provider PCP Type    Primary Doctor No General            Reason for Visit / Chief Complaint: std test (Patient presents to clinic complaints std and pregnancy /Rm)         History of Present Illness / Problem Focused Workflow     Shruthi Christian presents to the clinic with std test (Patient presents to clinic complaints std and pregnancy /Rm)     HPI  Last cycle in Oct Heavy last 5 days.   On oral ocp.   Nonsmoker  Urine pregnancy negative today  Partner may have been with someone else. Request STD screen.   Hx of cervical dysplasia prior colposcopy 2 years ago, Dr Linus Benitez [place]    Review of Systems     Review of Systems   Constitutional:  Negative for activity change, appetite change, chills, diaphoresis, fatigue, fever and unexpected weight change.   HENT:  Negative for congestion, ear pain, facial swelling, hearing loss, nosebleeds and sore throat.    Eyes: Negative.    Respiratory:  Negative for apnea, cough, shortness of breath and wheezing.    Cardiovascular:  Negative for chest pain, palpitations and leg swelling.   Gastrointestinal:  Negative for abdominal distention, abdominal pain, blood in stool, constipation, diarrhea and nausea.   Endocrine: Negative for cold intolerance, heat intolerance, polydipsia, polyphagia and polyuria.   Genitourinary:  Positive for vaginal discharge. Negative for decreased urine volume, difficulty urinating, dysuria, flank pain, frequency, hematuria and urgency.   Musculoskeletal:  Negative for arthralgias, joint swelling and myalgias.   Skin:  Negative for color change and rash.   Allergic/Immunologic: Negative.    Neurological:  Negative for dizziness, tremors, seizures, syncope, facial asymmetry, speech difficulty, weakness, light-headedness, numbness and headaches.   Hematological:  Negative for adenopathy. Does not  bruise/bleed easily.   Psychiatric/Behavioral:  Negative for behavioral problems and confusion.        Medical / Social / Family History     Past Medical History:   Diagnosis Date    Anxiety     BECKY (generalized anxiety disorder)     GERD (gastroesophageal reflux disease)     Menorrhagia        Past Surgical History:   Procedure Laterality Date    COLPOSCOPY  April 2022    WISDOM TOOTH EXTRACTION         Social History  Ms.  reports that she has never smoked. She has never used smokeless tobacco. She reports that she does not currently use alcohol. She reports that she does not use drugs.    Family History  Ms.'s family history includes Thyroid disease in her maternal grandmother and mother.    Medications and Allergies     Medications  Outpatient Medications Marked as Taking for the 10/23/23 encounter (Office Visit) with Kaity Ybarra FNP   Medication Sig Dispense Refill    drospirenone, contraceptive, 4 mg (28) Tab Take 4 mg by mouth once daily.         Allergies  Review of patient's allergies indicates:  No Known Allergies    Physical Examination     Vitals:    10/23/23 1434   BP: 125/68   BP Location: Right arm   Patient Position: Sitting   BP Method: Large (Automatic)   Pulse: 88   Resp: 20   Temp: 98.7 °F (37.1 °C)   TempSrc: Oral   SpO2: 99%   Weight: 117.9 kg (260 lb)   Height: 6' (1.829 m)       Physical Exam  Vitals reviewed.   Constitutional:       Appearance: Normal appearance. She is obese.   HENT:      Head: Normocephalic.      Right Ear: External ear normal.      Left Ear: External ear normal.      Nose: Nose normal.      Mouth/Throat:      Mouth: Mucous membranes are moist.   Eyes:      Extraocular Movements: Extraocular movements intact.   Cardiovascular:      Rate and Rhythm: Normal rate and regular rhythm.      Pulses: Normal pulses.   Pulmonary:      Effort: Pulmonary effort is normal.   Abdominal:      Palpations: Abdomen is soft.   Musculoskeletal:         General: Normal range of motion.       Cervical back: Normal range of motion.   Skin:     General: Skin is warm and dry.      Capillary Refill: Capillary refill takes less than 2 seconds.   Neurological:      General: No focal deficit present.      Mental Status: She is alert and oriented to person, place, and time.   Psychiatric:         Mood and Affect: Mood normal.         Behavior: Behavior normal.         Thought Content: Thought content normal.         Judgment: Judgment normal.           Office Visit on 10/23/2023   Component Date Value Ref Range Status    POC Preg Test, Ur 10/23/2023 Negative  Negative Final     Acceptable 10/23/2023 Yes   Final    Color, UA 10/23/2023 Yellow   Final    Spec Grav UA 10/23/2023 1.030   Final    pH, UA 10/23/2023 6.5   Final    WBC, UA 10/23/2023 Negative   Final    Nitrite, UA 10/23/2023 Negative   Final    Protein, POC 10/23/2023 Trace   Final    Glucose, UA 10/23/2023 Negative   Final    Ketones, UA 10/23/2023 Negative   Final    Bilirubin, POC 10/23/2023 Negative   Final    Urobilinogen, UA 10/23/2023 0.2   Final    Blood, UA 10/23/2023 Negative   Final             Assessment and Plan (including Health Maintenance)         Plan:   Screen for STD (sexually transmitted disease)  -     Cancel: POCT URINALYSIS W/O SCOPE; Future  -     Chlamydia/GC, PCR; Future; Expected date: 10/23/2023  -     Bacterial Vaginosis; Future; Expected date: 10/23/2023  -     HSV 1 & 2, IgG; Future; Expected date: 10/23/2023  -     HIV 1/2 Ag/Ab (4th Gen); Future; Expected date: 10/23/2023  -     Hepatitis C Antibody; Future; Expected date: 10/23/2023    Vaginal discharge  -     POCT urine pregnancy  -     Chlamydia/GC, PCR; Future; Expected date: 10/23/2023  -     Bacterial Vaginosis; Future; Expected date: 10/23/2023  -     HSV 1 & 2, IgG; Future; Expected date: 10/23/2023  -     HIV 1/2 Ag/Ab (4th Gen); Future; Expected date: 10/23/2023  -     Hepatitis C Antibody; Future; Expected date: 10/23/2023  -     POCT  URINALYSIS W/O SCOPE     Urine preg negative   Trace protein UA increase oral hydration   There are no Patient Instructions on file for this visit.       Health Maintenance Due   Topic Date Due    Lipid Panel  Never done    COVID-19 Vaccine (1) Never done    TETANUS VACCINE  Never done    Influenza Vaccine (1) Never done         There is no immunization history on file for this patient.     Problem List Items Addressed This Visit    None  Visit Diagnoses       Screen for STD (sexually transmitted disease)    -  Primary    Relevant Orders    Chlamydia/GC, PCR    Bacterial Vaginosis    HSV 1 & 2, IgG    HIV 1/2 Ag/Ab (4th Gen)    Hepatitis C Antibody    Vaginal discharge        Relevant Orders    POCT urine pregnancy (Completed)    Chlamydia/GC, PCR    Bacterial Vaginosis    HSV 1 & 2, IgG    HIV 1/2 Ag/Ab (4th Gen)    Hepatitis C Antibody    POCT URINALYSIS W/O SCOPE (Completed)            Health Maintenance Topics with due status: Not Due       Topic Last Completion Date    Pap Smear 01/09/2023       No future appointments.         Signature:  MERE Murillo  [facility]     Date of encounter: 10/23/23

## 2023-10-24 LAB
CHLAMYDIA BY PCR: NEGATIVE
HSV TYPE 1 AB IGG INDEX: 0.04
HSV TYPE 2 AB IGG INDEX: 0.08
HSV1 IGG SER QL: NEGATIVE
HSV2 IGG SER QL: NEGATIVE
N. GONORRHOEAE (GC) BY PCR: NEGATIVE